# Patient Record
Sex: FEMALE | Race: WHITE | NOT HISPANIC OR LATINO | Employment: PART TIME | URBAN - METROPOLITAN AREA
[De-identification: names, ages, dates, MRNs, and addresses within clinical notes are randomized per-mention and may not be internally consistent; named-entity substitution may affect disease eponyms.]

---

## 2017-05-04 DIAGNOSIS — Z30.9 ENCOUNTER FOR CONTRACEPTIVE MANAGEMENT, UNSPECIFIED TYPE: Primary | ICD-10-CM

## 2017-05-04 RX ORDER — NORGESTIMATE AND ETHINYL ESTRADIOL 7DAYSX3 28
1 KIT ORAL DAILY
Qty: 28 TABLET | Refills: 0 | Status: SHIPPED | OUTPATIENT
Start: 2017-05-04 | End: 2017-05-26 | Stop reason: SDUPTHER

## 2017-05-04 NOTE — TELEPHONE ENCOUNTER
Pt is requesting a refill on Ortho tri-cyclen she's unsure of the date of her last annual, but asked if she can a one month supply [place]. She will make an appointment once she returns.  Please advise

## 2017-05-04 NOTE — TELEPHONE ENCOUNTER
----- Message from Deidre He sent at 5/4/2017 12:12 PM CDT -----  Contact: Pt  Pt request refill on her birth control, I offered an apt and pt declined, ..the Pharmacy is [facility] in [place] and the number is [phone]    Please contact pt at [phone] or [phone]

## 2017-05-26 ENCOUNTER — TELEPHONE (OUTPATIENT)
Dept: OBSTETRICS AND GYNECOLOGY | Facility: CLINIC | Age: 37
End: 2017-05-26

## 2017-05-26 DIAGNOSIS — Z30.41 ENCOUNTER FOR SURVEILLANCE OF CONTRACEPTIVE PILLS: Primary | ICD-10-CM

## 2017-05-26 DIAGNOSIS — Z30.9 ENCOUNTER FOR CONTRACEPTIVE MANAGEMENT, UNSPECIFIED TYPE: ICD-10-CM

## 2017-05-26 RX ORDER — NORGESTIMATE AND ETHINYL ESTRADIOL 7DAYSX3 28
1 KIT ORAL DAILY
Qty: 28 TABLET | Refills: 0 | Status: SHIPPED | OUTPATIENT
Start: 2017-05-26 | End: 2017-07-07 | Stop reason: SDUPTHER

## 2017-05-26 NOTE — TELEPHONE ENCOUNTER
Please advise pt , 1 refill sent on ocp; overdue for ww exam since 10/2016; no further refills will be sent

## 2017-06-12 ENCOUNTER — TELEPHONE (OUTPATIENT)
Dept: OBSTETRICS AND GYNECOLOGY | Facility: CLINIC | Age: 37
End: 2017-06-12

## 2017-06-12 NOTE — TELEPHONE ENCOUNTER
----- Message from Anila Pack sent at 6/12/2017  1:08 PM CDT -----  Would like to speak to nurse about medication. Please call back at [phone]. thanks

## 2017-06-12 NOTE — TELEPHONE ENCOUNTER
Patient is requesting a refil on her birth control to [facility] in [place]. Pt has apt scheduled for 7.7.17..nj

## 2017-06-13 NOTE — TELEPHONE ENCOUNTER
Please see previous msg  From 5/2017  Last rx to be sent; over due for well woman exam since10/2016!!!

## 2017-06-16 ENCOUNTER — TELEPHONE (OUTPATIENT)
Dept: OBSTETRICS AND GYNECOLOGY | Facility: CLINIC | Age: 37
End: 2017-06-16

## 2017-06-16 NOTE — TELEPHONE ENCOUNTER
Left a message for the pt. to call back to discuss her  rx being sent the pharmacy and to make sure she keeps her annual appt. ssmith,lpn

## 2017-07-07 ENCOUNTER — OFFICE VISIT (OUTPATIENT)
Dept: OBSTETRICS AND GYNECOLOGY | Facility: CLINIC | Age: 37
End: 2017-07-07
Payer: COMMERCIAL

## 2017-07-07 VITALS
SYSTOLIC BLOOD PRESSURE: 118 MMHG | BODY MASS INDEX: 20.31 KG/M2 | DIASTOLIC BLOOD PRESSURE: 74 MMHG | HEIGHT: 63 IN | WEIGHT: 114.63 LBS

## 2017-07-07 DIAGNOSIS — Z01.419 ENCOUNTER FOR GYNECOLOGICAL EXAMINATION (GENERAL) (ROUTINE) WITHOUT ABNORMAL FINDINGS: Primary | ICD-10-CM

## 2017-07-07 DIAGNOSIS — Z30.9 ENCOUNTER FOR CONTRACEPTIVE MANAGEMENT, UNSPECIFIED TYPE: ICD-10-CM

## 2017-07-07 DIAGNOSIS — Z12.4 SCREENING FOR CERVICAL CANCER: ICD-10-CM

## 2017-07-07 DIAGNOSIS — N63.11 BREAST LUMP ON RIGHT SIDE AT 10 O'CLOCK POSITION: ICD-10-CM

## 2017-07-07 DIAGNOSIS — Z30.41 ENCOUNTER FOR SURVEILLANCE OF CONTRACEPTIVE PILLS: ICD-10-CM

## 2017-07-07 PROCEDURE — 88175 CYTOPATH C/V AUTO FLUID REDO: CPT

## 2017-07-07 PROCEDURE — 99395 PREV VISIT EST AGE 18-39: CPT | Mod: S$GLB,,, | Performed by: OBSTETRICS & GYNECOLOGY

## 2017-07-07 PROCEDURE — 99999 PR PBB SHADOW E&M-EST. PATIENT-LVL III: CPT | Mod: PBBFAC,,, | Performed by: OBSTETRICS & GYNECOLOGY

## 2017-07-07 RX ORDER — NORGESTIMATE AND ETHINYL ESTRADIOL 7DAYSX3 28
1 KIT ORAL DAILY
Qty: 28 TABLET | Refills: 11 | Status: SHIPPED | OUTPATIENT
Start: 2017-07-07 | End: 2018-07-05 | Stop reason: SDUPTHER

## 2017-07-07 NOTE — PROGRESS NOTES
Subjective:       Patient ID: Purnima Davis is a 37 y.o. female.    Chief Complaint:  Well Woman      History of Present Illness  HPI  Annual Exam-Premenopausal  Patient presents for annual exam. The patient has no complaints today. The patient is sexually active--using ocp for contraception. GYN screening history: last pap: approximate date 2015 and was normal. The patient wears seatbelts: yes. The patient participates in regular exercise: yes. --walks for exercise.  Has the patient ever been transfused or tattooed?: yes. The patient reports that there is not domestic violence in her life.    Menses monthly on orthotricyclen, flow 5 days, min dysmenorrhea;       GYN & OB HistoryPatient's last menstrual period was 06/14/2017.   Date of Last Pap: No result found    OB History   No data available       Review of Systems  Review of Systems   Constitutional: Negative for activity change, appetite change, chills, diaphoresis, fatigue, fever and unexpected weight change.   HENT: Negative for mouth sores and tinnitus.    Eyes: Negative for discharge and visual disturbance.   Respiratory: Negative for cough, shortness of breath and wheezing.    Cardiovascular: Negative for chest pain, palpitations and leg swelling.   Gastrointestinal: Negative for abdominal pain, bloating, blood in stool, constipation, diarrhea, nausea and vomiting.   Endocrine: Negative for diabetes, hair loss, hot flashes, hyperthyroidism and hypothyroidism.   Genitourinary: Negative for decreased libido, dyspareunia, dysuria, flank pain, frequency, genital sores, hematuria, menorrhagia, menstrual problem, pelvic pain, urgency, vaginal bleeding, vaginal discharge, vaginal pain, dysmenorrhea, urinary incontinence, postcoital bleeding, postmenopausal bleeding and vaginal odor.   Musculoskeletal: Negative for back pain and myalgias.   Skin:  Negative for rash, no acne and hair changes.   Neurological: Negative for seizures, syncope, numbness and  headaches.   Hematological: Negative for adenopathy. Does not bruise/bleed easily.   Psychiatric/Behavioral: Negative for depression and sleep disturbance. The patient is not nervous/anxious.    Breast: Negative for breast mass, breast pain, nipple discharge and skin changes          Objective:    Physical Exam:   Constitutional: She appears well-developed.     Eyes: Conjunctivae and EOM are normal. Pupils are equal, round, and reactive to light.    Neck: Normal range of motion. Neck supple.     Pulmonary/Chest: Effort normal. Right breast exhibits mass. Right breast exhibits no nipple discharge, no skin change and no tenderness. Left breast exhibits no mass, no nipple discharge, no skin change and no tenderness. Breasts are symmetrical.        Abdominal: Soft.     Genitourinary: Rectum normal, vagina normal and uterus normal. Pelvic exam was performed with patient supine. Cervix is normal. Right adnexum displays no mass and no tenderness. Left adnexum displays no mass and no tenderness. No erythema, bleeding, rectocele, cystocele or unspecified prolapse of vaginal walls in the vagina. No vaginal discharge found. Labial bartholins normal.       Uterus Size: 6 cm   Musculoskeletal: Normal range of motion.       Neurological: She is alert.    Skin: Skin is warm.    Psychiatric: She has a normal mood and affect.      (rt breast mass--10 oclock, 1 cm, round, rough, hard0  Assessment:            Encounter Diagnoses   Name Primary?    Encounter for gynecological examination (general) (routine) without abnormal findings Yes    Screening for cervical cancer     Encounter for surveillance of contraceptive pills     Breast lump on right side at 10 o'clock position     Encounter for contraceptive management, unspecified type        Plan:      Continue annual well woman exam.  Pap today  Dx mammo ordered  Continue ocp as directed, rx sent for orthotricyclen  Continue diet, exercise, weight loss

## 2017-07-13 ENCOUNTER — TELEPHONE (OUTPATIENT)
Dept: OBSTETRICS AND GYNECOLOGY | Facility: CLINIC | Age: 37
End: 2017-07-13

## 2017-07-20 ENCOUNTER — HOSPITAL ENCOUNTER (OUTPATIENT)
Dept: RADIOLOGY | Facility: HOSPITAL | Age: 37
Discharge: HOME | End: 2017-07-20
Attending: OBSTETRICS & GYNECOLOGY
Payer: COMMERCIAL

## 2017-07-20 DIAGNOSIS — N63.11 BREAST LUMP ON RIGHT SIDE AT 10 O'CLOCK POSITION: ICD-10-CM

## 2017-07-20 PROCEDURE — 76642 ULTRASOUND BREAST LIMITED: CPT | Mod: 26,RT,, | Performed by: RADIOLOGY

## 2017-07-20 PROCEDURE — 77066 DX MAMMO INCL CAD BI: CPT | Mod: TC

## 2017-07-20 PROCEDURE — 77066 DX MAMMO INCL CAD BI: CPT | Mod: 26,,, | Performed by: RADIOLOGY

## 2017-07-20 PROCEDURE — 76642 ULTRASOUND BREAST LIMITED: CPT | Mod: TC,PO,RT

## 2017-07-20 PROCEDURE — 77062 BREAST TOMOSYNTHESIS BI: CPT | Mod: 26,,, | Performed by: RADIOLOGY

## 2018-07-05 DIAGNOSIS — Z30.9 ENCOUNTER FOR CONTRACEPTIVE MANAGEMENT, UNSPECIFIED TYPE: ICD-10-CM

## 2018-07-05 RX ORDER — NORGESTIMATE AND ETHINYL ESTRADIOL 7DAYSX3 28
KIT ORAL
Qty: 28 TABLET | Refills: 11 | Status: SHIPPED | OUTPATIENT
Start: 2018-07-05 | End: 2018-08-14 | Stop reason: SDUPTHER

## 2018-07-05 NOTE — TELEPHONE ENCOUNTER
Made pt. Appointment for her annual on 8/14/18. Pt. States she needs a refill on her birth control to last her until appt.

## 2018-07-05 NOTE — TELEPHONE ENCOUNTER
----- Message from Jazmin Coulter sent at 7/5/2018 11:41 AM CDT -----  birth control refill needed..[phone] (home) ..      [facility]/pharmacy #[id number] - [place] - [address] [facility]  [address]  [place] [address]  Phone: [phone] Fax: [phone]

## 2018-08-14 ENCOUNTER — OFFICE VISIT (OUTPATIENT)
Dept: OBSTETRICS AND GYNECOLOGY | Facility: CLINIC | Age: 38
End: 2018-08-14
Payer: COMMERCIAL

## 2018-08-14 VITALS — WEIGHT: 121.25 LBS | HEIGHT: 63 IN | BODY MASS INDEX: 21.48 KG/M2

## 2018-08-14 DIAGNOSIS — Z12.4 SCREENING FOR CERVICAL CANCER: Primary | ICD-10-CM

## 2018-08-14 DIAGNOSIS — Z30.9 ENCOUNTER FOR CONTRACEPTIVE MANAGEMENT, UNSPECIFIED TYPE: ICD-10-CM

## 2018-08-14 PROCEDURE — 99395 PREV VISIT EST AGE 18-39: CPT | Mod: S$GLB,,, | Performed by: OBSTETRICS & GYNECOLOGY

## 2018-08-14 PROCEDURE — 88175 CYTOPATH C/V AUTO FLUID REDO: CPT

## 2018-08-14 PROCEDURE — 99999 PR PBB SHADOW E&M-EST. PATIENT-LVL II: CPT | Mod: PBBFAC,,, | Performed by: OBSTETRICS & GYNECOLOGY

## 2018-08-14 RX ORDER — NORGESTIMATE AND ETHINYL ESTRADIOL 7DAYSX3 28
1 KIT ORAL DAILY
Qty: 28 TABLET | Refills: 11 | Status: SHIPPED | OUTPATIENT
Start: 2018-08-14 | End: 2019-06-05 | Stop reason: SDUPTHER

## 2018-08-14 NOTE — PROGRESS NOTES
"CC: Well woman exam    Purnima Davis is a 38 y.o. female G0PO presents for a well woman exam.  LMP: Patient's last menstrual period was 2018..  No issues, problems, or complaints. She has taken OCPs for 18 years without issues.  She does not smoke.      History reviewed. No pertinent past medical history.  History reviewed. No pertinent surgical history.  Social History     Socioeconomic History    Marital status:      Spouse name: None    Number of children: None    Years of education: None    Highest education level: None   Social Needs    Financial resource strain: None    Food insecurity - worry: None    Food insecurity - inability: None    Transportation needs - medical: None    Transportation needs - non-medical: None   Occupational History    None   Tobacco Use    Smoking status: Never Smoker    Smokeless tobacco: Never Used   Substance and Sexual Activity    Alcohol use: Yes     Frequency: Monthly or less    Drug use: No    Sexual activity: Yes     Partners: Male   Other Topics Concern    None   Social History Narrative    None     History reviewed. No pertinent family history.  OB History      Para Term  AB Living        0          SAB TAB Ectopic Multiple Live Births                       Current Outpatient Medications:     norgestimate-ethinyl estradiol (ORTHO TRI-CYCLEN, 28,) 0.18/0.215/0.25 mg-35 mcg (28) tablet, Take 1 tablet by mouth once daily., Disp: 28 tablet, Rfl: 11    GYNECOLOGY HISTORY:  No history of abnormal pap smears    DATA REVIEWED:  Last pap: 2017 Results: normal    Ht 5' 2.5" (1.588 m)   Wt 55 kg (121 lb 4.1 oz)   LMP 2018   BMI 21.82 kg/m²     ROS:  GENERAL: Denies weight gain or weight loss. Feeling well overall.   SKIN: Denies rash or lesions.   HEAD: Denies head injury or headache.   NODES: Denies enlarged lymph nodes.   CHEST: Denies chest pain or shortness of breath.   CARDIOVASCULAR: Denies palpitations or left sided " chest pain.   ABDOMEN: No abdominal pain, constipation, diarrhea, nausea, vomiting or rectal bleeding.   URINARY: No frequency, dysuria, hematuria, or burning on urination.  REPRODUCTIVE: See HPI.   BREASTS: The patient denies pain, lumps, or nipple discharge.   HEMATOLOGIC: No easy bruisability or excessive bleeding.   MUSCULOSKELETAL: Denies joint pain or swelling.   NEUROLOGIC: Denies syncope or weakness.   PSYCHIATRIC: Denies depression, anxiety or mood swings.    PHYSICAL EXAM:    APPEARANCE: Well nourished, well developed, in no acute distress.  AFFECT: WNL, alert and oriented x 3  SKIN: No acne or hirsutism  NECK: Neck symmetric without masses or thyromegaly  NODES: No inguinal, cervical, axillary, or femoral lymph node enlargement  CHEST: Good respiratory effect  ABDOMEN: Soft.  No tenderness or masses.  No hepatosplenomegaly.  No hernias.  BREASTS: Symmetrical, no skin changes or visible lesions.  No palpable masses, nipple discharge bilaterally.  PELVIC: Normal external genitalia without lesions.  Normal hair distribution.  Adequate perineal body, normal urethral meatus.  Vagina moist and well rugated without lesions or discharge.  Cervix pink, without lesions, discharge or tenderness.  No significant cystocele or rectocele.  Bimanual exam shows uterus to be normal size, regular, mobile and nontender.  Adnexa without masses or tenderness.    EXTREMITIES: No edema.    Encounter for contraceptive management, unspecified type  -     norgestimate-ethinyl estradiol (ORTHO TRI-CYCLEN, 28,) 0.18/0.215/0.25 mg-35 mcg (28) tablet; Take 1 tablet by mouth once daily.  Dispense: 28 tablet; Refill: 11        Patient was counseled today on A.C.S. Pap guidelines (Q3) and recommendations for yearly pelvic exams, yearly mammograms starting age 40, and clinical breast exams; to see her PCP for other health maintenance.

## 2018-08-17 ENCOUNTER — TELEPHONE (OUTPATIENT)
Dept: OBSTETRICS AND GYNECOLOGY | Facility: CLINIC | Age: 38
End: 2018-08-17

## 2019-06-05 ENCOUNTER — TELEPHONE (OUTPATIENT)
Dept: OBSTETRICS AND GYNECOLOGY | Facility: CLINIC | Age: 39
End: 2019-06-05

## 2019-06-05 DIAGNOSIS — Z30.9 ENCOUNTER FOR CONTRACEPTIVE MANAGEMENT, UNSPECIFIED TYPE: ICD-10-CM

## 2019-06-05 RX ORDER — NORGESTIMATE AND ETHINYL ESTRADIOL 7DAYSX3 28
1 KIT ORAL DAILY
Qty: 28 TABLET | Refills: 3 | Status: SHIPPED | OUTPATIENT
Start: 2019-06-05 | End: 2019-08-27 | Stop reason: SDUPTHER

## 2019-06-05 NOTE — TELEPHONE ENCOUNTER
RX Name and Strength: norgestimate-ethinyl estradiol (ORTHO TRI-CYCLEN, 28,) 0.18/0.215/0.25 mg-35 mcg (28) tablet     Preferred Pharmacy with phone number[id number] [address]   Local or Mail Order:Local   Ordering Provider:Lakesha Calderon MD   Would the patient rather a call back or a response via [facility]?call   Best Call Back Number:[phone].   Additional Information: Pt has 6 tablets left.

## 2019-06-06 ENCOUNTER — TELEPHONE (OUTPATIENT)
Dept: OBSTETRICS AND GYNECOLOGY | Facility: CLINIC | Age: 39
End: 2019-06-06

## 2019-06-06 NOTE — TELEPHONE ENCOUNTER
Spoke with pt, advised refills were sent in on yesterday to her pharm. Pt verbalized understanding.

## 2019-06-06 NOTE — TELEPHONE ENCOUNTER
----- Message from Rahel Cherry sent at 6/6/2019  2:33 PM CDT -----  Contact: Self  Type:  Patient Returning Call    Who Called:Purnima  Who Left Message for Patient:  Does the patient know what this is regarding?:  Would the patient rather a call back or a response via [facility]? call  Best Call Back Number:[phone]  Additional Information:

## 2019-08-27 DIAGNOSIS — Z30.9 ENCOUNTER FOR CONTRACEPTIVE MANAGEMENT, UNSPECIFIED TYPE: ICD-10-CM

## 2019-08-27 RX ORDER — NORGESTIMATE AND ETHINYL ESTRADIOL 7DAYSX3 28
KIT ORAL
Qty: 84 TABLET | Refills: 1 | Status: SHIPPED | OUTPATIENT
Start: 2019-08-27 | End: 2019-09-10 | Stop reason: ALTCHOICE

## 2019-09-10 DIAGNOSIS — Z30.9 ENCOUNTER FOR CONTRACEPTIVE MANAGEMENT, UNSPECIFIED TYPE: ICD-10-CM

## 2019-09-10 RX ORDER — NORGESTIMATE AND ETHINYL ESTRADIOL 7DAYSX3 28
1 KIT ORAL DAILY
Qty: 84 TABLET | Refills: 1 | Status: CANCELLED | OUTPATIENT
Start: 2019-09-10

## 2019-09-10 RX ORDER — NORGESTIMATE AND ETHINYL ESTRADIOL 7DAYSX3 28
1 KIT ORAL DAILY
Qty: 28 TABLET | Refills: 1 | Status: SHIPPED | OUTPATIENT
Start: 2019-09-10 | End: 2019-11-15 | Stop reason: SDUPTHER

## 2019-09-10 NOTE — TELEPHONE ENCOUNTER
Pt pharm went out of business.   Updated pt pharm. Medication pending.    Please advise. Thank you.

## 2019-09-10 NOTE — TELEPHONE ENCOUNTER
----- Message from Whitney Davis sent at 9/10/2019 12:52 PM CDT -----  Contact: self/[phone]  Would like to consult with nurse regarding medication(Ortho-Tri-Cyclen),patients pharmacy has went out of business and need medication to go to [facility] [address]. Please call back at [phone]. Thanks/ar

## 2019-11-15 DIAGNOSIS — Z30.9 ENCOUNTER FOR CONTRACEPTIVE MANAGEMENT, UNSPECIFIED TYPE: ICD-10-CM

## 2019-11-18 RX ORDER — NORGESTIMATE AND ETHINYL ESTRADIOL 7DAYSX3 28
KIT ORAL
Qty: 28 TABLET | Refills: 0 | Status: SHIPPED | OUTPATIENT
Start: 2019-11-18 | End: 2019-11-22 | Stop reason: SDUPTHER

## 2019-11-22 ENCOUNTER — OFFICE VISIT (OUTPATIENT)
Dept: OBSTETRICS AND GYNECOLOGY | Facility: CLINIC | Age: 39
End: 2019-11-22
Payer: COMMERCIAL

## 2019-11-22 VITALS
WEIGHT: 121.25 LBS | SYSTOLIC BLOOD PRESSURE: 122 MMHG | DIASTOLIC BLOOD PRESSURE: 78 MMHG | BODY MASS INDEX: 21.48 KG/M2 | HEIGHT: 63 IN

## 2019-11-22 DIAGNOSIS — Z01.419 ENCOUNTER FOR GYNECOLOGICAL EXAMINATION (GENERAL) (ROUTINE) WITHOUT ABNORMAL FINDINGS: Primary | ICD-10-CM

## 2019-11-22 DIAGNOSIS — Z30.9 ENCOUNTER FOR CONTRACEPTIVE MANAGEMENT, UNSPECIFIED TYPE: ICD-10-CM

## 2019-11-22 DIAGNOSIS — Z12.31 SCREENING MAMMOGRAM, ENCOUNTER FOR: ICD-10-CM

## 2019-11-22 DIAGNOSIS — Z12.4 SCREENING FOR CERVICAL CANCER: ICD-10-CM

## 2019-11-22 PROCEDURE — 99395 PREV VISIT EST AGE 18-39: CPT | Mod: S$GLB,,, | Performed by: OBSTETRICS & GYNECOLOGY

## 2019-11-22 PROCEDURE — 99999 PR PBB SHADOW E&M-EST. PATIENT-LVL III: ICD-10-PCS | Mod: PBBFAC,,, | Performed by: OBSTETRICS & GYNECOLOGY

## 2019-11-22 PROCEDURE — 99395 PR PREVENTIVE VISIT,EST,18-39: ICD-10-PCS | Mod: S$GLB,,, | Performed by: OBSTETRICS & GYNECOLOGY

## 2019-11-22 PROCEDURE — 99999 PR PBB SHADOW E&M-EST. PATIENT-LVL III: CPT | Mod: PBBFAC,,, | Performed by: OBSTETRICS & GYNECOLOGY

## 2019-11-22 RX ORDER — NORGESTIMATE AND ETHINYL ESTRADIOL 7DAYSX3 28
1 KIT ORAL DAILY
Qty: 84 TABLET | Refills: 3 | Status: SHIPPED | OUTPATIENT
Start: 2019-11-22 | End: 2021-01-06 | Stop reason: SDUPTHER

## 2019-11-22 NOTE — PROGRESS NOTES
Subjective:       Patient ID: Purnima Davis is a 39 y.o. female.    Chief Complaint:  Well Woman and Medication Refill (tri sprintec (BRAND))      History of Present Illness  HPI  Annual Exam-Premenopausal  Patient presents for annual exam. The patient has no complaints today. The patient is sexually active--denies pelvic pain; , ocp for contraception. GYN screening history: last pap: approximate date 2018 and was normal and last mammogram: was normal . The patient wears seatbelts: yes. The patient participates in regular exercise: no. Has the patient ever been transfused or tattooed?: no. The patient reports that there is not domestic violence in her life.    Menses monthly at end of pill pack; 4-5d; d 1-2 d; super tampon--q 3 hrs; denies dysmenorrhea;  Patient reports occas spotting on the 3rd wk of pill pack    Denies urinary leakage  Still pursuing acting carreer and working at [employer]  GYN & OB History  Patient's last menstrual period was 2019 (exact date).   Date of Last Pap: 2018    OB History    Para Term  AB Living       0         SAB TAB Ectopic Multiple Live Births                   Review of Systems  Review of Systems   All other systems reviewed and are negative.          Objective:      Physical Exam:   Constitutional: She appears well-developed.     Eyes: Pupils are equal, round, and reactive to light. Conjunctivae and EOM are normal.    Neck: Normal range of motion. Neck supple.     Pulmonary/Chest: Effort normal. Right breast exhibits no mass, no nipple discharge, no skin change and no tenderness. Left breast exhibits no mass, no nipple discharge, no skin change and no tenderness. Breasts are symmetrical.        Abdominal: Soft.     Genitourinary: Rectum normal, vagina normal and uterus normal. Pelvic exam was performed with patient supine. Cervix is normal. Right adnexum displays no mass and no tenderness. Left adnexum displays no mass and no tenderness.  No erythema, bleeding, rectocele, cystocele or unspecified prolapse of vaginal walls in the vagina. No vaginal discharge found. Labial bartholins normal.       Uterus Size: 6 cm   Musculoskeletal: Normal range of motion.       Neurological: She is alert.    Skin: Skin is warm.    Psychiatric: She has a normal mood and affect.           Assessment:         Encounter Diagnoses   Name Primary?    Screening mammogram, encounter for     Encounter for contraceptive management, unspecified type     Encounter for gynecological examination (general) (routine) without abnormal findings Yes    Screening for cervical cancer                Plan:      Continue annual well woman exam.  Pap 2018, due in 2021; Reviewed updated recommendations for pap smears (every 3 years) in low risk patients.   Recommend annual pelvic exams.  Reviewed recommendations for annual CBE.  Continue ocp as directed, rx sent for trisprintec; reviewed daily consistent use to help decrease btb  Continue diet, exercise, weight loss  mammo ordered, continue yearly until age 75

## 2021-01-06 DIAGNOSIS — Z30.9 ENCOUNTER FOR CONTRACEPTIVE MANAGEMENT, UNSPECIFIED TYPE: ICD-10-CM

## 2021-01-06 RX ORDER — NORGESTIMATE AND ETHINYL ESTRADIOL 7DAYSX3 28
1 KIT ORAL DAILY
Qty: 84 TABLET | Refills: 0 | Status: SHIPPED | OUTPATIENT
Start: 2021-01-06 | End: 2021-01-11 | Stop reason: SDUPTHER

## 2021-01-11 ENCOUNTER — OFFICE VISIT (OUTPATIENT)
Dept: OBSTETRICS AND GYNECOLOGY | Facility: CLINIC | Age: 41
End: 2021-01-11
Payer: COMMERCIAL

## 2021-01-11 VITALS
HEIGHT: 63 IN | BODY MASS INDEX: 21.54 KG/M2 | DIASTOLIC BLOOD PRESSURE: 78 MMHG | SYSTOLIC BLOOD PRESSURE: 114 MMHG | WEIGHT: 121.56 LBS

## 2021-01-11 DIAGNOSIS — Z12.31 SCREENING MAMMOGRAM, ENCOUNTER FOR: ICD-10-CM

## 2021-01-11 DIAGNOSIS — Z12.4 SCREENING FOR CERVICAL CANCER: ICD-10-CM

## 2021-01-11 DIAGNOSIS — Z01.419 ENCOUNTER FOR GYNECOLOGICAL EXAMINATION (GENERAL) (ROUTINE) WITHOUT ABNORMAL FINDINGS: Primary | ICD-10-CM

## 2021-01-11 DIAGNOSIS — Z30.9 ENCOUNTER FOR CONTRACEPTIVE MANAGEMENT, UNSPECIFIED TYPE: ICD-10-CM

## 2021-01-11 PROCEDURE — 1126F PR PAIN SEVERITY QUANTIFIED, NO PAIN PRESENT: ICD-10-PCS | Mod: S$GLB,,, | Performed by: OBSTETRICS & GYNECOLOGY

## 2021-01-11 PROCEDURE — 3008F BODY MASS INDEX DOCD: CPT | Mod: CPTII,S$GLB,, | Performed by: OBSTETRICS & GYNECOLOGY

## 2021-01-11 PROCEDURE — 99999 PR PBB SHADOW E&M-EST. PATIENT-LVL III: CPT | Mod: PBBFAC,,, | Performed by: OBSTETRICS & GYNECOLOGY

## 2021-01-11 PROCEDURE — 3008F PR BODY MASS INDEX (BMI) DOCUMENTED: ICD-10-PCS | Mod: CPTII,S$GLB,, | Performed by: OBSTETRICS & GYNECOLOGY

## 2021-01-11 PROCEDURE — 87624 HPV HI-RISK TYP POOLED RSLT: CPT

## 2021-01-11 PROCEDURE — 88175 CYTOPATH C/V AUTO FLUID REDO: CPT

## 2021-01-11 PROCEDURE — 1126F AMNT PAIN NOTED NONE PRSNT: CPT | Mod: S$GLB,,, | Performed by: OBSTETRICS & GYNECOLOGY

## 2021-01-11 PROCEDURE — 99396 PR PREVENTIVE VISIT,EST,40-64: ICD-10-PCS | Mod: S$GLB,,, | Performed by: OBSTETRICS & GYNECOLOGY

## 2021-01-11 PROCEDURE — 99396 PREV VISIT EST AGE 40-64: CPT | Mod: S$GLB,,, | Performed by: OBSTETRICS & GYNECOLOGY

## 2021-01-11 PROCEDURE — 99999 PR PBB SHADOW E&M-EST. PATIENT-LVL III: ICD-10-PCS | Mod: PBBFAC,,, | Performed by: OBSTETRICS & GYNECOLOGY

## 2021-01-11 RX ORDER — NORGESTIMATE AND ETHINYL ESTRADIOL 7DAYSX3 28
1 KIT ORAL DAILY
Qty: 84 TABLET | Refills: 3 | Status: SHIPPED | OUTPATIENT
Start: 2021-01-11 | End: 2022-01-24

## 2021-01-20 LAB
HPV HR 12 DNA SPEC QL NAA+PROBE: NEGATIVE
HPV16 AG SPEC QL: NEGATIVE
HPV18 DNA SPEC QL NAA+PROBE: NEGATIVE

## 2021-01-27 ENCOUNTER — HOSPITAL ENCOUNTER (OUTPATIENT)
Dept: RADIOLOGY | Facility: HOSPITAL | Age: 41
Discharge: HOME | End: 2021-01-27
Attending: OBSTETRICS & GYNECOLOGY
Payer: COMMERCIAL

## 2021-01-27 DIAGNOSIS — Z12.31 SCREENING MAMMOGRAM, ENCOUNTER FOR: ICD-10-CM

## 2021-01-27 PROCEDURE — 77063 BREAST TOMOSYNTHESIS BI: CPT | Mod: 26,,, | Performed by: RADIOLOGY

## 2021-01-27 PROCEDURE — 77067 SCR MAMMO BI INCL CAD: CPT | Mod: TC

## 2021-01-27 PROCEDURE — 77067 SCR MAMMO BI INCL CAD: CPT | Mod: 26,,, | Performed by: RADIOLOGY

## 2021-01-27 PROCEDURE — 77063 MAMMO DIGITAL SCREENING BILAT WITH TOMO: ICD-10-PCS | Mod: 26,,, | Performed by: RADIOLOGY

## 2021-01-27 PROCEDURE — 77067 MAMMO DIGITAL SCREENING BILAT WITH TOMO: ICD-10-PCS | Mod: 26,,, | Performed by: RADIOLOGY

## 2021-02-06 LAB
FINAL PATHOLOGIC DIAGNOSIS: NORMAL
Lab: NORMAL

## 2021-09-23 ENCOUNTER — OFFICE VISIT (OUTPATIENT)
Dept: OBSTETRICS AND GYNECOLOGY | Facility: CLINIC | Age: 41
End: 2021-09-23
Payer: COMMERCIAL

## 2021-09-23 VITALS
WEIGHT: 123 LBS | DIASTOLIC BLOOD PRESSURE: 80 MMHG | BODY MASS INDEX: 21.79 KG/M2 | HEIGHT: 63 IN | SYSTOLIC BLOOD PRESSURE: 118 MMHG

## 2021-09-23 DIAGNOSIS — N94.9 VAGINAL BURNING: Primary | ICD-10-CM

## 2021-09-23 DIAGNOSIS — Z11.3 ENCOUNTER FOR SCREENING FOR INFECTIONS WITH PREDOMINANTLY SEXUAL MODE OF TRANSMISSION: ICD-10-CM

## 2021-09-23 LAB
BILIRUB SERPL-MCNC: NEGATIVE MG/DL
BLOOD URINE, POC: NEGATIVE
CLARITY, POC UA: CLEAR
COLOR, POC UA: NORMAL
GLUCOSE UR QL STRIP: NORMAL
KETONES UR QL STRIP: NEGATIVE
LEUKOCYTE ESTERASE URINE, POC: NORMAL
NITRITE, POC UA: NEGATIVE
PH, POC UA: 5
PROTEIN, POC: NEGATIVE
SPECIFIC GRAVITY, POC UA: 1.02
UROBILINOGEN, POC UA: NORMAL

## 2021-09-23 PROCEDURE — 99213 OFFICE O/P EST LOW 20 MIN: CPT | Mod: 25,S$GLB,, | Performed by: NURSE PRACTITIONER

## 2021-09-23 PROCEDURE — 1160F RVW MEDS BY RX/DR IN RCRD: CPT | Mod: CPTII,S$GLB,, | Performed by: NURSE PRACTITIONER

## 2021-09-23 PROCEDURE — 99213 PR OFFICE/OUTPT VISIT, EST, LEVL III, 20-29 MIN: ICD-10-PCS | Mod: 25,S$GLB,, | Performed by: NURSE PRACTITIONER

## 2021-09-23 PROCEDURE — 3008F PR BODY MASS INDEX (BMI) DOCUMENTED: ICD-10-PCS | Mod: CPTII,S$GLB,, | Performed by: NURSE PRACTITIONER

## 2021-09-23 PROCEDURE — 87086 URINE CULTURE/COLONY COUNT: CPT | Performed by: NURSE PRACTITIONER

## 2021-09-23 PROCEDURE — 3079F PR MOST RECENT DIASTOLIC BLOOD PRESSURE 80-89 MM HG: ICD-10-PCS | Mod: CPTII,S$GLB,, | Performed by: NURSE PRACTITIONER

## 2021-09-23 PROCEDURE — 87481 CANDIDA DNA AMP PROBE: CPT | Mod: 59 | Performed by: NURSE PRACTITIONER

## 2021-09-23 PROCEDURE — 1159F MED LIST DOCD IN RCRD: CPT | Mod: CPTII,S$GLB,, | Performed by: NURSE PRACTITIONER

## 2021-09-23 PROCEDURE — 81002 URINALYSIS NONAUTO W/O SCOPE: CPT | Mod: S$GLB,,, | Performed by: NURSE PRACTITIONER

## 2021-09-23 PROCEDURE — 3074F SYST BP LT 130 MM HG: CPT | Mod: CPTII,S$GLB,, | Performed by: NURSE PRACTITIONER

## 2021-09-23 PROCEDURE — 81002 POCT URINE DIPSTICK WITHOUT MICROSCOPE: ICD-10-PCS | Mod: S$GLB,,, | Performed by: NURSE PRACTITIONER

## 2021-09-23 PROCEDURE — 87661 TRICHOMONAS VAGINALIS AMPLIF: CPT | Mod: 59 | Performed by: NURSE PRACTITIONER

## 2021-09-23 PROCEDURE — 87491 CHLMYD TRACH DNA AMP PROBE: CPT | Performed by: NURSE PRACTITIONER

## 2021-09-23 PROCEDURE — 87591 N.GONORRHOEAE DNA AMP PROB: CPT | Mod: 59 | Performed by: NURSE PRACTITIONER

## 2021-09-23 PROCEDURE — 1160F PR REVIEW ALL MEDS BY PRESCRIBER/CLIN PHARMACIST DOCUMENTED: ICD-10-PCS | Mod: CPTII,S$GLB,, | Performed by: NURSE PRACTITIONER

## 2021-09-23 PROCEDURE — 99999 PR PBB SHADOW E&M-EST. PATIENT-LVL III: ICD-10-PCS | Mod: PBBFAC,,, | Performed by: NURSE PRACTITIONER

## 2021-09-23 PROCEDURE — 3008F BODY MASS INDEX DOCD: CPT | Mod: CPTII,S$GLB,, | Performed by: NURSE PRACTITIONER

## 2021-09-23 PROCEDURE — 99999 PR PBB SHADOW E&M-EST. PATIENT-LVL III: CPT | Mod: PBBFAC,,, | Performed by: NURSE PRACTITIONER

## 2021-09-23 PROCEDURE — 3074F PR MOST RECENT SYSTOLIC BLOOD PRESSURE < 130 MM HG: ICD-10-PCS | Mod: CPTII,S$GLB,, | Performed by: NURSE PRACTITIONER

## 2021-09-23 PROCEDURE — 1159F PR MEDICATION LIST DOCUMENTED IN MEDICAL RECORD: ICD-10-PCS | Mod: CPTII,S$GLB,, | Performed by: NURSE PRACTITIONER

## 2021-09-23 PROCEDURE — 3079F DIAST BP 80-89 MM HG: CPT | Mod: CPTII,S$GLB,, | Performed by: NURSE PRACTITIONER

## 2021-09-24 LAB
C TRACH DNA SPEC QL NAA+PROBE: NOT DETECTED
N GONORRHOEA DNA SPEC QL NAA+PROBE: NOT DETECTED

## 2021-09-25 LAB — BACTERIA UR CULT: NO GROWTH

## 2021-09-29 DIAGNOSIS — B37.31 YEAST VAGINITIS: Primary | ICD-10-CM

## 2021-09-29 LAB
BACTERIAL VAGINOSIS DNA: NEGATIVE
CANDIDA GLABRATA DNA: NEGATIVE
CANDIDA KRUSEI DNA: NEGATIVE
CANDIDA RRNA VAG QL PROBE: POSITIVE
T VAGINALIS RRNA GENITAL QL PROBE: NEGATIVE

## 2021-09-29 RX ORDER — FLUCONAZOLE 150 MG/1
150 TABLET ORAL
Qty: 2 TABLET | Refills: 0 | Status: SHIPPED | OUTPATIENT
Start: 2021-09-29 | End: 2021-10-03

## 2021-09-30 ENCOUNTER — TELEPHONE (OUTPATIENT)
Dept: OBSTETRICS AND GYNECOLOGY | Facility: CLINIC | Age: 41
End: 2021-09-30

## 2022-01-31 ENCOUNTER — OFFICE VISIT (OUTPATIENT)
Dept: OBSTETRICS AND GYNECOLOGY | Facility: CLINIC | Age: 42
End: 2022-01-31
Payer: COMMERCIAL

## 2022-01-31 VITALS
BODY MASS INDEX: 22.76 KG/M2 | HEIGHT: 62 IN | DIASTOLIC BLOOD PRESSURE: 72 MMHG | WEIGHT: 123.69 LBS | SYSTOLIC BLOOD PRESSURE: 128 MMHG

## 2022-01-31 DIAGNOSIS — Z12.31 BREAST CANCER SCREENING BY MAMMOGRAM: ICD-10-CM

## 2022-01-31 DIAGNOSIS — Z01.419 ROUTINE GYNECOLOGICAL EXAMINATION: Primary | ICD-10-CM

## 2022-01-31 DIAGNOSIS — Z30.9 ENCOUNTER FOR CONTRACEPTIVE MANAGEMENT, UNSPECIFIED TYPE: ICD-10-CM

## 2022-01-31 PROCEDURE — 3008F BODY MASS INDEX DOCD: CPT | Mod: CPTII,S$GLB,, | Performed by: NURSE PRACTITIONER

## 2022-01-31 PROCEDURE — 99999 PR PBB SHADOW E&M-EST. PATIENT-LVL III: CPT | Mod: PBBFAC,,, | Performed by: NURSE PRACTITIONER

## 2022-01-31 PROCEDURE — 99999 PR PBB SHADOW E&M-EST. PATIENT-LVL III: ICD-10-PCS | Mod: PBBFAC,,, | Performed by: NURSE PRACTITIONER

## 2022-01-31 PROCEDURE — 1160F RVW MEDS BY RX/DR IN RCRD: CPT | Mod: CPTII,S$GLB,, | Performed by: NURSE PRACTITIONER

## 2022-01-31 PROCEDURE — 1160F PR REVIEW ALL MEDS BY PRESCRIBER/CLIN PHARMACIST DOCUMENTED: ICD-10-PCS | Mod: CPTII,S$GLB,, | Performed by: NURSE PRACTITIONER

## 2022-01-31 PROCEDURE — 3078F DIAST BP <80 MM HG: CPT | Mod: CPTII,S$GLB,, | Performed by: NURSE PRACTITIONER

## 2022-01-31 PROCEDURE — 3078F PR MOST RECENT DIASTOLIC BLOOD PRESSURE < 80 MM HG: ICD-10-PCS | Mod: CPTII,S$GLB,, | Performed by: NURSE PRACTITIONER

## 2022-01-31 PROCEDURE — 3008F PR BODY MASS INDEX (BMI) DOCUMENTED: ICD-10-PCS | Mod: CPTII,S$GLB,, | Performed by: NURSE PRACTITIONER

## 2022-01-31 PROCEDURE — 1159F MED LIST DOCD IN RCRD: CPT | Mod: CPTII,S$GLB,, | Performed by: NURSE PRACTITIONER

## 2022-01-31 PROCEDURE — 3074F PR MOST RECENT SYSTOLIC BLOOD PRESSURE < 130 MM HG: ICD-10-PCS | Mod: CPTII,S$GLB,, | Performed by: NURSE PRACTITIONER

## 2022-01-31 PROCEDURE — 99396 PREV VISIT EST AGE 40-64: CPT | Mod: S$GLB,,, | Performed by: NURSE PRACTITIONER

## 2022-01-31 PROCEDURE — 1159F PR MEDICATION LIST DOCUMENTED IN MEDICAL RECORD: ICD-10-PCS | Mod: CPTII,S$GLB,, | Performed by: NURSE PRACTITIONER

## 2022-01-31 PROCEDURE — 3074F SYST BP LT 130 MM HG: CPT | Mod: CPTII,S$GLB,, | Performed by: NURSE PRACTITIONER

## 2022-01-31 PROCEDURE — 99396 PR PREVENTIVE VISIT,EST,40-64: ICD-10-PCS | Mod: S$GLB,,, | Performed by: NURSE PRACTITIONER

## 2022-01-31 RX ORDER — NORGESTIMATE AND ETHINYL ESTRADIOL 7DAYSX3 28
1 KIT ORAL DAILY
Qty: 84 TABLET | Refills: 3 | Status: SHIPPED | OUTPATIENT
Start: 2022-01-31 | End: 2023-02-15

## 2022-01-31 NOTE — PROGRESS NOTES
"CC: Well woman exam    HPI  Purnima Davis is a 41 y.o. female  presents for a well woman exam.  LMP: Patient's last menstrual period was 2022..  No issues, problems, or complaints.    No past medical history on file.  No past surgical history on file.  Social History     Socioeconomic History    Marital status:    Tobacco Use    Smoking status: Never Smoker    Smokeless tobacco: Never Used   Substance and Sexual Activity    Alcohol use: Yes     Comment: socially    Drug use: No    Sexual activity: Yes     Partners: Male     Family History   Problem Relation Age of Onset    Skin cancer Father      OB History        0    Para   0    Term   0       0    AB   0    Living   0       SAB   0    IAB   0    Ectopic   0    Multiple   0    Live Births   0                 Current Outpatient Medications:     norgestimate-ethinyl estradioL (TRI-SPRINTEC, 28,) 0.18/0.215/0.25 mg-35 mcg (28) tablet, Take 1 tablet by mouth once daily., Disp: 84 tablet, Rfl: 3    GYNECOLOGY HISTORY:  Desires birth control refills     DATA REVIEWED:  Last pap:  Normal     /72   Ht 5' 2" (1.575 m)   Wt 56.1 kg (123 lb 10.9 oz)   LMP 2022   BMI 22.62 kg/m²     Review of Systems  See HPI   Physical Exam  Constitutional:       Appearance: Normal appearance.   Neurological:      Mental Status: She is alert and oriented to person, place, and time.   Skin:     General: Skin is warm and dry.   Psychiatric:         Mood and Affect: Mood normal.         Behavior: Behavior normal.           Routine gynecological examination    Breast cancer screening by mammogram  -     Mammo Digital Screening Bilat; Future; Expected date: 2022    Encounter for contraceptive management, unspecified type  -     norgestimate-ethinyl estradioL (TRI-SPRINTEC, 28,) 0.18/0.215/0.25 mg-35 mcg (28) tablet; Take 1 tablet by mouth once daily.  Dispense: 84 tablet; Refill: 3        Patient was counseled today on A.C.S. " Pap guidelines (Q3) and recommendations for yearly pelvic exams, yearly mammograms starting age 40, and clinical breast exams; to see her PCP for other health maintenance.

## 2023-02-15 ENCOUNTER — OFFICE VISIT (OUTPATIENT)
Dept: OBSTETRICS AND GYNECOLOGY | Facility: CLINIC | Age: 43
End: 2023-02-15
Payer: COMMERCIAL

## 2023-02-15 VITALS
BODY MASS INDEX: 20.97 KG/M2 | WEIGHT: 114.63 LBS | DIASTOLIC BLOOD PRESSURE: 64 MMHG | SYSTOLIC BLOOD PRESSURE: 104 MMHG

## 2023-02-15 DIAGNOSIS — Z30.9 ENCOUNTER FOR CONTRACEPTIVE MANAGEMENT, UNSPECIFIED TYPE: ICD-10-CM

## 2023-02-15 DIAGNOSIS — Z01.419 ROUTINE GYNECOLOGICAL EXAMINATION: Primary | ICD-10-CM

## 2023-02-15 DIAGNOSIS — Z12.31 BREAST CANCER SCREENING BY MAMMOGRAM: ICD-10-CM

## 2023-02-15 PROCEDURE — 1159F PR MEDICATION LIST DOCUMENTED IN MEDICAL RECORD: ICD-10-PCS | Mod: CPTII,S$GLB,, | Performed by: NURSE PRACTITIONER

## 2023-02-15 PROCEDURE — 99396 PR PREVENTIVE VISIT,EST,40-64: ICD-10-PCS | Mod: S$GLB,,, | Performed by: NURSE PRACTITIONER

## 2023-02-15 PROCEDURE — 3078F DIAST BP <80 MM HG: CPT | Mod: CPTII,S$GLB,, | Performed by: NURSE PRACTITIONER

## 2023-02-15 PROCEDURE — 3078F PR MOST RECENT DIASTOLIC BLOOD PRESSURE < 80 MM HG: ICD-10-PCS | Mod: CPTII,S$GLB,, | Performed by: NURSE PRACTITIONER

## 2023-02-15 PROCEDURE — 99999 PR PBB SHADOW E&M-EST. PATIENT-LVL III: ICD-10-PCS | Mod: PBBFAC,,, | Performed by: NURSE PRACTITIONER

## 2023-02-15 PROCEDURE — 1160F PR REVIEW ALL MEDS BY PRESCRIBER/CLIN PHARMACIST DOCUMENTED: ICD-10-PCS | Mod: CPTII,S$GLB,, | Performed by: NURSE PRACTITIONER

## 2023-02-15 PROCEDURE — 3074F SYST BP LT 130 MM HG: CPT | Mod: CPTII,S$GLB,, | Performed by: NURSE PRACTITIONER

## 2023-02-15 PROCEDURE — 99999 PR PBB SHADOW E&M-EST. PATIENT-LVL III: CPT | Mod: PBBFAC,,, | Performed by: NURSE PRACTITIONER

## 2023-02-15 PROCEDURE — 3074F PR MOST RECENT SYSTOLIC BLOOD PRESSURE < 130 MM HG: ICD-10-PCS | Mod: CPTII,S$GLB,, | Performed by: NURSE PRACTITIONER

## 2023-02-15 PROCEDURE — 3008F BODY MASS INDEX DOCD: CPT | Mod: CPTII,S$GLB,, | Performed by: NURSE PRACTITIONER

## 2023-02-15 PROCEDURE — 1160F RVW MEDS BY RX/DR IN RCRD: CPT | Mod: CPTII,S$GLB,, | Performed by: NURSE PRACTITIONER

## 2023-02-15 PROCEDURE — 99396 PREV VISIT EST AGE 40-64: CPT | Mod: S$GLB,,, | Performed by: NURSE PRACTITIONER

## 2023-02-15 PROCEDURE — 1159F MED LIST DOCD IN RCRD: CPT | Mod: CPTII,S$GLB,, | Performed by: NURSE PRACTITIONER

## 2023-02-15 PROCEDURE — 3008F PR BODY MASS INDEX (BMI) DOCUMENTED: ICD-10-PCS | Mod: CPTII,S$GLB,, | Performed by: NURSE PRACTITIONER

## 2023-02-15 RX ORDER — NORGESTIMATE AND ETHINYL ESTRADIOL 7DAYSX3 28
1 KIT ORAL DAILY
Qty: 28 TABLET | Refills: 11 | Status: SHIPPED | OUTPATIENT
Start: 2023-02-15 | End: 2023-10-31

## 2023-02-15 RX ORDER — NORGESTIMATE AND ETHINYL ESTRADIOL 7DAYSX3 LO
1 KIT ORAL DAILY
Qty: 30 TABLET | Refills: 11 | Status: SHIPPED | OUTPATIENT
Start: 2023-02-15 | End: 2023-02-15 | Stop reason: ALTCHOICE

## 2023-02-15 NOTE — PROGRESS NOTES
Subjective:       Patient ID: Purnima Davis is a 42 y.o. female.    Chief Complaint:  Annual Exam      History of Present Illness  HPI    Health Maintenance   Topic Date Due    Hepatitis C Screening  Never done    Lipid Panel  Never done    TETANUS VACCINE  Never done    Mammogram  2022     GYN & OB History  Patient's last menstrual period was 2023.   Date of Last Pap: 2021    OB History    Para Term  AB Living   0 0 0 0 0 0   SAB IAB Ectopic Multiple Live Births   0 0 0 0 0       Review of Systems  Review of Systems        Objective:   /64   Wt 52 kg (114 lb 10.2 oz)   LMP 2023   BMI 20.97 kg/m²    Physical Exam:   Constitutional: She is oriented to person, place, and time. She appears well-developed and well-nourished.        Pulmonary/Chest: Right breast exhibits no inverted nipple, no mass, no nipple discharge, no skin change, no tenderness and no swelling. Left breast exhibits no inverted nipple, no mass, no nipple discharge, no skin change, no tenderness and no swelling.        Abdominal: Soft.     Genitourinary:    Inguinal canal, vagina, right adnexa and left adnexa normal.      Pelvic exam was performed with patient supine.   The external female genitalia was normal.   Genitalia hair distrobution normal .   Labial bartholins normal.Cervix is normal. No no adexnal prolapse. Right adnexum displays no mass, no tenderness and no fullness. Left adnexum displays no mass, no tenderness and no fullness. No erythema,  no vaginal discharge, bleeding, rectocele, cystocele or unspecified prolapse of vaginal walls in the vagina.    No foreign body in the vagina.      No signs of injury in the vagina.                 Neurological: She is alert and oriented to person, place, and time.    Skin: Skin is warm and dry.    Psychiatric: She has a normal mood and affect. Her behavior is normal. Judgment and thought content normal.      Assessment:        1. Routine gynecological  examination    2. Breast cancer screening by mammogram    3. Encounter for contraceptive management, unspecified type               Plan:         Purnima was seen today for annual exam.    Diagnoses and all orders for this visit:    Routine gynecological examination    Breast cancer screening by mammogram  -     Mammo Digital Screening Bilat; Future    Encounter for contraceptive management, unspecified type  -     Discontinue: norgestimate-ethinyl estradioL (ORTHO TRI-CYCLEN LO) 0.18/0.215/0.25 mg-25 mcg tablet; Take 1 tablet by mouth once daily.  -     norgestimate-ethinyl estradioL (ORTHO TRI-CYCLEN,TRI-SPRINTEC) 0.18/0.215/0.25 mg-35 mcg (28) tablet; Take 1 tablet by mouth once daily.

## 2023-04-03 ENCOUNTER — HOSPITAL ENCOUNTER (OUTPATIENT)
Dept: RADIOLOGY | Facility: HOSPITAL | Age: 43
Discharge: HOME | End: 2023-04-03
Attending: NURSE PRACTITIONER
Payer: COMMERCIAL

## 2023-04-03 DIAGNOSIS — Z12.31 BREAST CANCER SCREENING BY MAMMOGRAM: ICD-10-CM

## 2023-04-03 PROCEDURE — 77067 SCR MAMMO BI INCL CAD: CPT | Mod: TC

## 2023-04-03 PROCEDURE — 77067 SCR MAMMO BI INCL CAD: CPT | Mod: 26,,, | Performed by: RADIOLOGY

## 2023-04-03 PROCEDURE — 77063 BREAST TOMOSYNTHESIS BI: CPT | Mod: 26,,, | Performed by: RADIOLOGY

## 2023-04-03 PROCEDURE — 77063 MAMMO DIGITAL SCREENING BILAT WITH TOMO: ICD-10-PCS | Mod: 26,,, | Performed by: RADIOLOGY

## 2023-04-03 PROCEDURE — 77067 MAMMO DIGITAL SCREENING BILAT WITH TOMO: ICD-10-PCS | Mod: 26,,, | Performed by: RADIOLOGY

## 2023-10-13 ENCOUNTER — OFFICE VISIT (OUTPATIENT)
Dept: OBSTETRICS AND GYNECOLOGY | Facility: CLINIC | Age: 43
End: 2023-10-13
Payer: COMMERCIAL

## 2023-10-13 VITALS
HEIGHT: 62 IN | DIASTOLIC BLOOD PRESSURE: 68 MMHG | BODY MASS INDEX: 20.4 KG/M2 | WEIGHT: 110.88 LBS | SYSTOLIC BLOOD PRESSURE: 106 MMHG

## 2023-10-13 DIAGNOSIS — N93.8 DUB (DYSFUNCTIONAL UTERINE BLEEDING): Primary | ICD-10-CM

## 2023-10-13 DIAGNOSIS — N85.2 ENLARGED UTERUS: ICD-10-CM

## 2023-10-13 LAB
B-HCG UR QL: NEGATIVE
C TRACH DNA SPEC QL NAA+PROBE: NOT DETECTED
CTP QC/QA: YES
N GONORRHOEA DNA SPEC QL NAA+PROBE: NOT DETECTED

## 2023-10-13 PROCEDURE — 81025 POCT URINE PREGNANCY: ICD-10-PCS | Mod: S$GLB,,, | Performed by: NURSE PRACTITIONER

## 2023-10-13 PROCEDURE — 3008F BODY MASS INDEX DOCD: CPT | Mod: CPTII,S$GLB,, | Performed by: NURSE PRACTITIONER

## 2023-10-13 PROCEDURE — 87591 N.GONORRHOEAE DNA AMP PROB: CPT | Performed by: NURSE PRACTITIONER

## 2023-10-13 PROCEDURE — 1160F RVW MEDS BY RX/DR IN RCRD: CPT | Mod: CPTII,S$GLB,, | Performed by: NURSE PRACTITIONER

## 2023-10-13 PROCEDURE — 3008F PR BODY MASS INDEX (BMI) DOCUMENTED: ICD-10-PCS | Mod: CPTII,S$GLB,, | Performed by: NURSE PRACTITIONER

## 2023-10-13 PROCEDURE — 81025 URINE PREGNANCY TEST: CPT | Mod: S$GLB,,, | Performed by: NURSE PRACTITIONER

## 2023-10-13 PROCEDURE — 88305 TISSUE EXAM BY PATHOLOGIST: CPT | Performed by: PATHOLOGY

## 2023-10-13 PROCEDURE — 3078F DIAST BP <80 MM HG: CPT | Mod: CPTII,S$GLB,, | Performed by: NURSE PRACTITIONER

## 2023-10-13 PROCEDURE — 3074F SYST BP LT 130 MM HG: CPT | Mod: CPTII,S$GLB,, | Performed by: NURSE PRACTITIONER

## 2023-10-13 PROCEDURE — 3078F PR MOST RECENT DIASTOLIC BLOOD PRESSURE < 80 MM HG: ICD-10-PCS | Mod: CPTII,S$GLB,, | Performed by: NURSE PRACTITIONER

## 2023-10-13 PROCEDURE — 99213 OFFICE O/P EST LOW 20 MIN: CPT | Mod: 25,S$GLB,, | Performed by: NURSE PRACTITIONER

## 2023-10-13 PROCEDURE — 99999 PR PBB SHADOW E&M-EST. PATIENT-LVL III: CPT | Mod: PBBFAC,,, | Performed by: NURSE PRACTITIONER

## 2023-10-13 PROCEDURE — 58100 ENDOMETRIAL BIOPSY: ICD-10-PCS | Mod: S$GLB,,, | Performed by: NURSE PRACTITIONER

## 2023-10-13 PROCEDURE — 3074F PR MOST RECENT SYSTOLIC BLOOD PRESSURE < 130 MM HG: ICD-10-PCS | Mod: CPTII,S$GLB,, | Performed by: NURSE PRACTITIONER

## 2023-10-13 PROCEDURE — 1160F PR REVIEW ALL MEDS BY PRESCRIBER/CLIN PHARMACIST DOCUMENTED: ICD-10-PCS | Mod: CPTII,S$GLB,, | Performed by: NURSE PRACTITIONER

## 2023-10-13 PROCEDURE — 99999 PR PBB SHADOW E&M-EST. PATIENT-LVL III: ICD-10-PCS | Mod: PBBFAC,,, | Performed by: NURSE PRACTITIONER

## 2023-10-13 PROCEDURE — 1159F MED LIST DOCD IN RCRD: CPT | Mod: CPTII,S$GLB,, | Performed by: NURSE PRACTITIONER

## 2023-10-13 PROCEDURE — 88305 TISSUE EXAM BY PATHOLOGIST: ICD-10-PCS | Mod: 26,,, | Performed by: PATHOLOGY

## 2023-10-13 PROCEDURE — 1159F PR MEDICATION LIST DOCUMENTED IN MEDICAL RECORD: ICD-10-PCS | Mod: CPTII,S$GLB,, | Performed by: NURSE PRACTITIONER

## 2023-10-13 PROCEDURE — 58100 BIOPSY OF UTERUS LINING: CPT | Mod: S$GLB,,, | Performed by: NURSE PRACTITIONER

## 2023-10-13 PROCEDURE — 99213 PR OFFICE/OUTPT VISIT, EST, LEVL III, 20-29 MIN: ICD-10-PCS | Mod: 25,S$GLB,, | Performed by: NURSE PRACTITIONER

## 2023-10-13 PROCEDURE — 88305 TISSUE EXAM BY PATHOLOGIST: CPT | Mod: 26,,, | Performed by: PATHOLOGY

## 2023-10-13 NOTE — LETTER
October 13, 2023      [facility]  [address] [facility] [place] [address]  Phone: [phone]  Fax: [phone]       Patient: Purnima Davis   YOB: 1980  Date of Visit: 10/13/2023    To Whom It May Concern:    Remy Davis  was at [facility] on 10/13/2023. The patient may return to work/school on 11/4/2023 with no restrictions. If you have any questions or concerns, or if I can be of further assistance, please do not hesitate to contact me.    Sincerely,    Paola WatersNP

## 2023-10-13 NOTE — PROGRESS NOTES
Subjective:       Patient ID: Purnima Davis is a 43 y.o. female.    Chief Complaint:  Fibroids    Patient's last menstrual period was 10/05/2023.  History of Present Illness    She felt some knots in her stomach so she went to her PCP.   PCP did an exam, ordered imaging and advised her to see GYN  On EUGENE but cycles are all over the place (sometimes she is spotting and sometimes she has two cycles a month). This is new for her, her cycles are usually monthly and last 4-5 days. Denies missing pills     Per CT scan  The uterus is enlarged with multiple lesions most consistent with fibroids. The largest is a fundal lesion measuring approximately 8.5 cm. This likely represents the palpable abnormality.   There is a right adnexal mass which has similar imaging characteristics measuring approximately 5 cm. This does blend with the right side of the uterus and may represent a pedunculated subserosal fibroid.    OB History    Para Term  AB Living   0 0 0 0 0 0   SAB IAB Ectopic Multiple Live Births   0 0 0 0 0       Review of Systems  Review of Systems        Objective:    Physical Exam  Genitourinary:     General: Normal vulva.      Vagina: No signs of injury and foreign body. No vaginal discharge, erythema, tenderness, bleeding, lesions or prolapsed vaginal walls.      Cervix: No cervical motion tenderness, discharge, friability, lesion, erythema, cervical bleeding or eversion.      Uterus: Enlarged (20 weeks). Not deviated, not fixed, not tender and no uterine prolapse.       Adnexa:         Right: No mass, tenderness or fullness.          Left: No mass, tenderness or fullness.           Assessment:     1. DUB (dysfunctional uterine bleeding)    2. Enlarged uterus              Plan:   Purnima was seen today for fibroids.    Diagnoses and all orders for this visit:    DUB (dysfunctional uterine bleeding)  -     CBC Auto Differential; Future  -     TSH; Future  -     US Pelvis Complete Non OB; Future  -      C. trachomatis/N. gonorrhoeae by AMP DNA [facility]; Urine  -     POCT Urine Pregnancy  -     Specimen to Pathology, Ob/Gyn  -     Endometrial biopsy    Enlarged uterus    Refer to GYN MD (Dr. Calderon) for further evaluation

## 2023-10-13 NOTE — PROCEDURES
Endometrial biopsy    Date/Time: 10/13/2023 8:00 AM    Performed by: Paola Waters NP  Authorized by: Paola Waters NP    Consent:     Consent obtained:  Verbal    Consent given by:  Patient    Patient questions answered: yes      Patient agrees, verbalizes understanding, and wants to proceed: yes      Educational handouts given: yes      Instructions and paperwork completed: yes    Indication:     Indications: Menorrhagia    Pre-procedure:     Pre-procedure timeout performed: yes    Procedure:     Procedure: endometrial biopsy with Pipelle      Cervix cleaned and prepped: yes      A paracervical block was performed: no      An intracervical block was performed: no      The cervix was dilated: no      Uterus sounded: no      Specimen collected: specimen collected and sent to pathology      Patient tolerated procedure well with no complications: yes

## 2023-10-17 ENCOUNTER — TELEPHONE (OUTPATIENT)
Dept: OBSTETRICS AND GYNECOLOGY | Facility: CLINIC | Age: 43
End: 2023-10-17
Payer: COMMERCIAL

## 2023-10-17 NOTE — TELEPHONE ENCOUNTER
----- Message from Savi Ceja sent at 10/16/2023 10:50 AM CDT -----  [facility] with NATHALY Loredo is requesting a call back concerning the patient. Call back at [phone]

## 2023-10-19 ENCOUNTER — APPOINTMENT (OUTPATIENT)
Dept: RADIOLOGY | Facility: HOSPITAL | Age: 43
End: 2023-10-19
Attending: NURSE PRACTITIONER
Payer: COMMERCIAL

## 2023-10-19 DIAGNOSIS — N93.8 DUB (DYSFUNCTIONAL UTERINE BLEEDING): ICD-10-CM

## 2023-10-19 PROCEDURE — 76856 US PELVIS COMP WITH TRANSVAG NON-OB (XPD): ICD-10-PCS | Mod: 26,,, | Performed by: RADIOLOGY

## 2023-10-19 PROCEDURE — 76830 TRANSVAGINAL US NON-OB: CPT | Mod: 26,,, | Performed by: RADIOLOGY

## 2023-10-19 PROCEDURE — 76830 US PELVIS COMP WITH TRANSVAG NON-OB (XPD): ICD-10-PCS | Mod: 26,,, | Performed by: RADIOLOGY

## 2023-10-19 PROCEDURE — 76856 US EXAM PELVIC COMPLETE: CPT | Mod: 26,,, | Performed by: RADIOLOGY

## 2023-10-19 PROCEDURE — 76830 TRANSVAGINAL US NON-OB: CPT | Mod: TC,PO

## 2023-10-20 LAB
FINAL PATHOLOGIC DIAGNOSIS: NORMAL
GROSS: NORMAL
Lab: NORMAL

## 2023-10-23 ENCOUNTER — TELEPHONE (OUTPATIENT)
Dept: OBSTETRICS AND GYNECOLOGY | Facility: CLINIC | Age: 43
End: 2023-10-23
Payer: COMMERCIAL

## 2023-10-23 DIAGNOSIS — N85.2 ENLARGED UTERUS: Primary | ICD-10-CM

## 2023-10-23 NOTE — TELEPHONE ENCOUNTER
Spoke with client   Two patient identifiers verified   Informed client  of pelvic ultrasound results, verbalizes understanding that based on those results she will need a MRI with contrast done for further evaluation of pelvic lesion. Also reminded client that she will need bloodwork done.  MRI and blood work should be resulted prior to her visit with Dr. Calderon on 11/24/2023.   Verbalizes understanding that someone will be reaching out to her today to schedule labs and MRI with contrast.

## 2023-10-23 NOTE — TELEPHONE ENCOUNTER
----- Message from Paola Waters NP sent at 10/23/2023  8:09 AM CDT -----  Please call client and schedule labs and MRI with contrast.   These test must be done and resulted by 11/20/2023   Orders written

## 2023-10-23 NOTE — TELEPHONE ENCOUNTER
Called patient and scheduled labs and advised radiology will be calling her to schedule MRI.  Patient verbalized understanding.

## 2023-10-30 ENCOUNTER — LAB VISIT (OUTPATIENT)
Dept: LAB | Facility: HOSPITAL | Age: 43
End: 2023-10-30
Attending: NURSE PRACTITIONER
Payer: COMMERCIAL

## 2023-10-30 DIAGNOSIS — N93.8 DUB (DYSFUNCTIONAL UTERINE BLEEDING): ICD-10-CM

## 2023-10-30 DIAGNOSIS — N85.2 ENLARGED UTERUS: ICD-10-CM

## 2023-10-30 LAB
BASOPHILS # BLD AUTO: 0.04 K/UL (ref 0–0.2)
BASOPHILS NFR BLD: 0.8 % (ref 0–1.9)
CANCER AG125 SERPL-ACNC: 50 U/ML (ref 0–30)
DIFFERENTIAL METHOD: ABNORMAL
EOSINOPHIL # BLD AUTO: 0.1 K/UL (ref 0–0.5)
EOSINOPHIL NFR BLD: 1.5 % (ref 0–8)
ERYTHROCYTE [DISTWIDTH] IN BLOOD BY AUTOMATED COUNT: 11.6 % (ref 11.5–14.5)
HCT VFR BLD AUTO: 39.1 % (ref 37–48.5)
HGB BLD-MCNC: 12.7 G/DL (ref 12–16)
IMM GRANULOCYTES # BLD AUTO: 0.01 K/UL (ref 0–0.04)
IMM GRANULOCYTES NFR BLD AUTO: 0.2 % (ref 0–0.5)
LYMPHOCYTES # BLD AUTO: 1.9 K/UL (ref 1–4.8)
LYMPHOCYTES NFR BLD: 35 % (ref 18–48)
MCH RBC QN AUTO: 31.9 PG (ref 27–31)
MCHC RBC AUTO-ENTMCNC: 32.5 G/DL (ref 32–36)
MCV RBC AUTO: 98 FL (ref 82–98)
MONOCYTES # BLD AUTO: 0.4 K/UL (ref 0.3–1)
MONOCYTES NFR BLD: 7.7 % (ref 4–15)
NEUTROPHILS # BLD AUTO: 2.9 K/UL (ref 1.8–7.7)
NEUTROPHILS NFR BLD: 54.8 % (ref 38–73)
NRBC BLD-RTO: 0 /100 WBC
PLATELET # BLD AUTO: 325 K/UL (ref 150–450)
PMV BLD AUTO: 9.2 FL (ref 9.2–12.9)
RBC # BLD AUTO: 3.98 M/UL (ref 4–5.4)
TSH SERPL DL<=0.005 MIU/L-ACNC: 1.81 UIU/ML (ref 0.4–4)
WBC # BLD AUTO: 5.32 K/UL (ref 3.9–12.7)

## 2023-10-30 PROCEDURE — 86304 IMMUNOASSAY TUMOR CA 125: CPT | Performed by: NURSE PRACTITIONER

## 2023-10-30 PROCEDURE — 84443 ASSAY THYROID STIM HORMONE: CPT | Performed by: NURSE PRACTITIONER

## 2023-10-30 PROCEDURE — 85025 COMPLETE CBC W/AUTO DIFF WBC: CPT | Performed by: NURSE PRACTITIONER

## 2023-10-30 PROCEDURE — 36415 COLL VENOUS BLD VENIPUNCTURE: CPT | Mod: PO | Performed by: NURSE PRACTITIONER

## 2023-10-31 ENCOUNTER — TELEPHONE (OUTPATIENT)
Dept: OBSTETRICS AND GYNECOLOGY | Facility: CLINIC | Age: 43
End: 2023-10-31
Payer: COMMERCIAL

## 2023-10-31 NOTE — TELEPHONE ENCOUNTER
Spoke with client   Two patient identifiers verified   Informed client of  resutls and the need to be referred to GYN Oncology for further evaluation of right adnexa lesion, verbalizes understanding and would like to be referred to [facility].  Referral faxed to [facility]

## 2023-11-09 ENCOUNTER — HOSPITAL ENCOUNTER (OUTPATIENT)
Dept: RADIOLOGY | Facility: HOSPITAL | Age: 43
Discharge: HOME | End: 2023-11-09
Attending: NURSE PRACTITIONER
Payer: COMMERCIAL

## 2023-11-09 DIAGNOSIS — N85.2 ENLARGED UTERUS: ICD-10-CM

## 2023-11-09 PROCEDURE — 72197 MRI FEMALE PELVIS W W/O CONTRAST: ICD-10-PCS | Mod: 26,,, | Performed by: RADIOLOGY

## 2023-11-09 PROCEDURE — A9585 GADOBUTROL INJECTION: HCPCS | Performed by: NURSE PRACTITIONER

## 2023-11-09 PROCEDURE — 25500020 PHARM REV CODE 255: Performed by: NURSE PRACTITIONER

## 2023-11-09 PROCEDURE — 72197 MRI PELVIS W/O & W/DYE: CPT | Mod: 26,,, | Performed by: RADIOLOGY

## 2023-11-09 PROCEDURE — 72197 MRI PELVIS W/O & W/DYE: CPT | Mod: TC

## 2023-11-09 RX ORDER — GADOBUTROL 604.72 MG/ML
10 INJECTION INTRAVENOUS
Status: COMPLETED | OUTPATIENT
Start: 2023-11-09 | End: 2023-11-09

## 2023-11-09 RX ADMIN — GADOBUTROL 6 ML: 604.72 INJECTION INTRAVENOUS at 01:11

## 2023-12-04 NOTE — PROGRESS NOTES
Subjective:       Patient ID: Purnima Davis is a 43 y.o. female.    Chief Complaint:  Consult    Patient's last menstrual period was 2023 (approximate).  History of Present Illness  Presents today to restart Tri sprintec  Explained to client that we are unable to restart EUGENE until after we receive clinic notes from Dr. Isaac Bashir.  She was referred to [facility] for 5 cm right ovarian cyst with an elevated   States that she went to [facility] for evaluation, the provider informed her that it was not cancer but offered her different options (surgeries).  Client was not clear on surgical options and unsure of her options or next steps in her evaluation   She has return to [facility] because she wants to restart Tri Sprintec   Explained to client that we are unable to restart EUGENE until after we receive clinic notes from Dr. Isaac Bashir.  Client desires to be seen by Dr. Calderon after notes are received from [facility] in order to discuss options       OB History    Para Term  AB Living   0 0 0 0 0 0   SAB IAB Ectopic Multiple Live Births   0 0 0 0 0       Review of Systems  Review of Systems        Objective:    Physical Exam      Assessment:     1. Right ovarian cyst    2. Elevated CA-125              Plan:   Purnima was seen today for consult.    Diagnoses and all orders for this visit:    Right ovarian cyst    Elevated CA-125      Refer to Dr. Calderon for follow up

## 2023-12-05 ENCOUNTER — OFFICE VISIT (OUTPATIENT)
Dept: OBSTETRICS AND GYNECOLOGY | Facility: CLINIC | Age: 43
End: 2023-12-05
Payer: COMMERCIAL

## 2023-12-05 VITALS
SYSTOLIC BLOOD PRESSURE: 108 MMHG | DIASTOLIC BLOOD PRESSURE: 60 MMHG | WEIGHT: 110.25 LBS | BODY MASS INDEX: 20.29 KG/M2 | HEIGHT: 62 IN

## 2023-12-05 DIAGNOSIS — R97.1 ELEVATED CA-125: ICD-10-CM

## 2023-12-05 DIAGNOSIS — N83.201 RIGHT OVARIAN CYST: Primary | ICD-10-CM

## 2023-12-05 PROCEDURE — 3078F DIAST BP <80 MM HG: CPT | Mod: CPTII,S$GLB,, | Performed by: NURSE PRACTITIONER

## 2023-12-05 PROCEDURE — 99999 PR PBB SHADOW E&M-EST. PATIENT-LVL III: ICD-10-PCS | Mod: PBBFAC,,, | Performed by: NURSE PRACTITIONER

## 2023-12-05 PROCEDURE — 99999 PR PBB SHADOW E&M-EST. PATIENT-LVL III: CPT | Mod: PBBFAC,,, | Performed by: NURSE PRACTITIONER

## 2023-12-05 PROCEDURE — 1160F PR REVIEW ALL MEDS BY PRESCRIBER/CLIN PHARMACIST DOCUMENTED: ICD-10-PCS | Mod: CPTII,S$GLB,, | Performed by: NURSE PRACTITIONER

## 2023-12-05 PROCEDURE — 3074F SYST BP LT 130 MM HG: CPT | Mod: CPTII,S$GLB,, | Performed by: NURSE PRACTITIONER

## 2023-12-05 PROCEDURE — 1160F RVW MEDS BY RX/DR IN RCRD: CPT | Mod: CPTII,S$GLB,, | Performed by: NURSE PRACTITIONER

## 2023-12-05 PROCEDURE — 3074F PR MOST RECENT SYSTOLIC BLOOD PRESSURE < 130 MM HG: ICD-10-PCS | Mod: CPTII,S$GLB,, | Performed by: NURSE PRACTITIONER

## 2023-12-05 PROCEDURE — 99212 OFFICE O/P EST SF 10 MIN: CPT | Mod: S$GLB,,, | Performed by: NURSE PRACTITIONER

## 2023-12-05 PROCEDURE — 99212 PR OFFICE/OUTPT VISIT, EST, LEVL II, 10-19 MIN: ICD-10-PCS | Mod: S$GLB,,, | Performed by: NURSE PRACTITIONER

## 2023-12-05 PROCEDURE — 3008F BODY MASS INDEX DOCD: CPT | Mod: CPTII,S$GLB,, | Performed by: NURSE PRACTITIONER

## 2023-12-05 PROCEDURE — 3008F PR BODY MASS INDEX (BMI) DOCUMENTED: ICD-10-PCS | Mod: CPTII,S$GLB,, | Performed by: NURSE PRACTITIONER

## 2023-12-05 PROCEDURE — 3078F PR MOST RECENT DIASTOLIC BLOOD PRESSURE < 80 MM HG: ICD-10-PCS | Mod: CPTII,S$GLB,, | Performed by: NURSE PRACTITIONER

## 2023-12-05 PROCEDURE — 1159F MED LIST DOCD IN RCRD: CPT | Mod: CPTII,S$GLB,, | Performed by: NURSE PRACTITIONER

## 2023-12-05 PROCEDURE — 1159F PR MEDICATION LIST DOCUMENTED IN MEDICAL RECORD: ICD-10-PCS | Mod: CPTII,S$GLB,, | Performed by: NURSE PRACTITIONER

## 2023-12-05 RX ORDER — BENZONATATE 100 MG/1
100 CAPSULE ORAL
COMMUNITY
Start: 2023-08-17

## 2023-12-05 RX ORDER — TIZANIDINE 4 MG/1
4 TABLET ORAL EVERY 6 HOURS PRN
COMMUNITY
Start: 2023-11-09 | End: 2024-01-09

## 2023-12-05 RX ORDER — MELOXICAM 15 MG/1
15 TABLET ORAL
COMMUNITY
Start: 2023-11-09 | End: 2024-05-07

## 2023-12-05 RX ORDER — ALBUTEROL SULFATE 90 UG/1
AEROSOL, METERED RESPIRATORY (INHALATION)
COMMUNITY
Start: 2023-02-08

## 2023-12-27 ENCOUNTER — TELEPHONE (OUTPATIENT)
Dept: OBSTETRICS AND GYNECOLOGY | Facility: CLINIC | Age: 43
End: 2023-12-27
Payer: COMMERCIAL

## 2023-12-27 NOTE — TELEPHONE ENCOUNTER
Called patient and she stated DW was supposed to talk to a physician (oncology? Gyn?) and get back to her.  Patient asking for DW to call her.  Advised patient message would be sent to provider and she verbalized understanding.

## 2023-12-27 NOTE — TELEPHONE ENCOUNTER
----- Message from Rosalba Casas sent at 12/27/2023 10:36 AM CST -----  Contact: [phone]  Pt is calling to speak with nurse . Please call back at [phone]  . Cam[place]oj

## 2023-12-28 ENCOUNTER — TELEPHONE (OUTPATIENT)
Dept: OBSTETRICS AND GYNECOLOGY | Facility: HOSPITAL | Age: 43
End: 2023-12-28
Payer: COMMERCIAL

## 2023-12-28 RX ORDER — NORGESTIMATE AND ETHINYL ESTRADIOL 7DAYSX3 LO
1 KIT ORAL DAILY
Qty: 30 TABLET | Refills: 0 | Status: SHIPPED | OUTPATIENT
Start: 2023-12-28 | End: 2024-01-09

## 2023-12-28 NOTE — TELEPHONE ENCOUNTER
----- Message from Godwin Marin MA sent at 12/27/2023 10:46 AM CST -----  Contact: [phone]  Pt appt scheduled for 1/22/24   ----- Message -----  From: Rosalba Casas  Sent: 12/27/2023  10:39 AM CST  To: [facility] Staff    Pt is calling requesting a small supply of birth control meds until appt . Please call back at [phone] . Thanksdj               [facility] [address] [place] [address]  Phone: [phone] Fax: [phone]

## 2024-01-09 ENCOUNTER — OFFICE VISIT (OUTPATIENT)
Dept: OBSTETRICS AND GYNECOLOGY | Facility: CLINIC | Age: 44
End: 2024-01-09
Payer: COMMERCIAL

## 2024-01-09 VITALS — BODY MASS INDEX: 21.14 KG/M2 | WEIGHT: 114.88 LBS | HEIGHT: 62 IN

## 2024-01-09 DIAGNOSIS — Z30.41 ENCOUNTER FOR SURVEILLANCE OF CONTRACEPTIVE PILLS: ICD-10-CM

## 2024-01-09 DIAGNOSIS — N85.2 ENLARGED UTERUS: Primary | ICD-10-CM

## 2024-01-09 DIAGNOSIS — N92.1 METRORRHAGIA: ICD-10-CM

## 2024-01-09 PROCEDURE — 99214 OFFICE O/P EST MOD 30 MIN: CPT | Mod: S$GLB,,, | Performed by: OBSTETRICS & GYNECOLOGY

## 2024-01-09 PROCEDURE — 3008F BODY MASS INDEX DOCD: CPT | Mod: CPTII,S$GLB,, | Performed by: OBSTETRICS & GYNECOLOGY

## 2024-01-09 PROCEDURE — 99999 PR PBB SHADOW E&M-EST. PATIENT-LVL III: CPT | Mod: PBBFAC,,, | Performed by: OBSTETRICS & GYNECOLOGY

## 2024-01-09 PROCEDURE — 1159F MED LIST DOCD IN RCRD: CPT | Mod: CPTII,S$GLB,, | Performed by: OBSTETRICS & GYNECOLOGY

## 2024-01-09 RX ORDER — NORGESTIMATE AND ETHINYL ESTRADIOL 7DAYSX3 28
1 KIT ORAL DAILY
Qty: 90 TABLET | Refills: 1 | Status: SHIPPED | OUTPATIENT
Start: 2024-01-09 | End: 2025-01-08

## 2024-01-09 NOTE — LETTER
January 9, 2024      [facility]  [address] [place] [address]  Phone: [phone]  Fax: [phone]       Patient: Purnima Davis   YOB: 1980  Date of Visit: 01/09/2024    To Whom It May Concern:    Remy Davis  was at [facility] on 01/09/2024. The patient may return to work/school on 01/09/24 with no restrictions. If you have any questions or concerns, or if I can be of further assistance, please do not hesitate to contact me.    Sincerely,    Godwin Marin MA      Performing Laboratory: [id number] Performing Laboratory: [id number]

## 2024-01-09 NOTE — PROGRESS NOTES
Subjective:       Patient ID: Purnima Davis is a 43 y.o. female.    Chief Complaint:  Follow-up (results)      History of Present Illness  Follow-up      Here for follow up  Reports continue to have irregular bleeding on ocp; and heavy at times  Needs refill on ocp--reports previous use of ortho tricyclen and trisprintec--(lo prescribed instead)    Sono report reviewed    Uterus:     Size: 14.6 x 8.2 x 8.3 cm     Masses: At least 2 intramural fibroids visualized measuring up to 8.2 cm and 6.1 cm respectively near the uterine fundus.     Endometrium: Normal in this pre menopausal patient, measuring 5 mm.     Right ovary:     Size: 3.3 x 1.9 x 2.3 cm     Appearance: There is a solid-appearing lesion with homogeneous internal echotexture measuring up to 5 cm immediately adjacent to the right ovary, potentially representing a pedunculated fibroid.  Right ovary otherwise appears within normal limits.     Vascular flow: Normal.     Left ovary:     Not visualized     Free Fluid:     None.     Impression:     1. Uterine fibroids as above  2. Solid 5 cm lesion in the right adnexa immediately adjacent to the ovary, potentially representing a pedunculated fibroid.  Contrast enhanced pelvic MRI may be useful for further characterization.     Mri report reviewed  5 cm solid mass--is a pedunculated fibroid and ovary is normal in size and superior to fibroid    GYN & OB History  Patient's last menstrual period was 2023 (approximate).   Date of Last Pap: 2021    OB History    Para Term  AB Living   0 0 0 0 0 0   SAB IAB Ectopic Multiple Live Births   0 0 0 0 0       Review of Systems  Review of Systems   Genitourinary:  Positive for menorrhagia and menstrual problem.   All other systems reviewed and are negative.          Objective:      Physical Exam:   Constitutional: She appears well-developed.     Eyes: Pupils are equal, round, and reactive to light. Conjunctivae and EOM are normal.       Pulmonary/Chest: Effort normal.        Abdominal: Soft.             Musculoskeletal: Normal range of motion.       Neurological: She is alert.    Skin: Skin is warm.    Psychiatric: She has a normal mood and affect.           Assessment:        1. Enlarged uterus    2. Metrorrhagia    3. Encounter for surveillance of contraceptive pills               Plan:   Reviewed options for uterine fibroids--myomectomy, hysterectomy  Could consider change to mirena or depo provera; or myfembre/orlissa (would still need something for preventing pregnancy)  Pt wishes to continue ocp for now  Rx sent for ortho tricyclen; ww exam due after 2/2024

## 2024-02-27 ENCOUNTER — OFFICE VISIT (OUTPATIENT)
Dept: OBSTETRICS AND GYNECOLOGY | Facility: CLINIC | Age: 44
End: 2024-02-27
Payer: COMMERCIAL

## 2024-02-27 VITALS
WEIGHT: 110.25 LBS | BODY MASS INDEX: 20.29 KG/M2 | HEIGHT: 62 IN | DIASTOLIC BLOOD PRESSURE: 82 MMHG | SYSTOLIC BLOOD PRESSURE: 118 MMHG

## 2024-02-27 DIAGNOSIS — D25.1 FIBROIDS, INTRAMURAL: ICD-10-CM

## 2024-02-27 DIAGNOSIS — Z01.419 ENCOUNTER FOR GYNECOLOGICAL EXAMINATION (GENERAL) (ROUTINE) WITHOUT ABNORMAL FINDINGS: Primary | ICD-10-CM

## 2024-02-27 DIAGNOSIS — Z12.4 SCREENING FOR CERVICAL CANCER: ICD-10-CM

## 2024-02-27 DIAGNOSIS — Z30.41 ENCOUNTER FOR SURVEILLANCE OF CONTRACEPTIVE PILLS: ICD-10-CM

## 2024-02-27 PROCEDURE — 99999 PR PBB SHADOW E&M-EST. PATIENT-LVL III: CPT | Mod: PBBFAC,,, | Performed by: OBSTETRICS & GYNECOLOGY

## 2024-02-27 PROCEDURE — 1159F MED LIST DOCD IN RCRD: CPT | Mod: CPTII,S$GLB,, | Performed by: OBSTETRICS & GYNECOLOGY

## 2024-02-27 PROCEDURE — 87624 HPV HI-RISK TYP POOLED RSLT: CPT | Performed by: OBSTETRICS & GYNECOLOGY

## 2024-02-27 PROCEDURE — 3074F SYST BP LT 130 MM HG: CPT | Mod: CPTII,S$GLB,, | Performed by: OBSTETRICS & GYNECOLOGY

## 2024-02-27 PROCEDURE — 99396 PREV VISIT EST AGE 40-64: CPT | Mod: S$GLB,,, | Performed by: OBSTETRICS & GYNECOLOGY

## 2024-02-27 PROCEDURE — 3008F BODY MASS INDEX DOCD: CPT | Mod: CPTII,S$GLB,, | Performed by: OBSTETRICS & GYNECOLOGY

## 2024-02-27 PROCEDURE — 88175 CYTOPATH C/V AUTO FLUID REDO: CPT | Performed by: OBSTETRICS & GYNECOLOGY

## 2024-02-27 PROCEDURE — 3079F DIAST BP 80-89 MM HG: CPT | Mod: CPTII,S$GLB,, | Performed by: OBSTETRICS & GYNECOLOGY

## 2024-02-27 RX ORDER — NORGESTIMATE AND ETHINYL ESTRADIOL 7DAYSX3 28
1 KIT ORAL DAILY
Qty: 90 TABLET | Refills: 3 | Status: SHIPPED | OUTPATIENT
Start: 2024-02-27 | End: 2025-02-26

## 2024-02-27 NOTE — PROGRESS NOTES
Subjective:       Patient ID: Purnima Davis is a 44 y.o. female.    Chief Complaint:  Annual Exam      History of Present Illness  HPI  Annual Exam-Premenopausal  Patient presents for annual exam. The patient has no complaints today. The patient is not sexually active. --currently ; GYN screening history: last pap: approximate date  and was normal and last mammogram: approximate date 2023 and was normal. The patient wears seatbelts: yes. The patient participates in regular exercise: no. --cardio/weights 1-2 times/mo; Has the patient ever been transfused or tattooed?: no. The patient reports that there is not domestic violence in her life.  Taking ocp as directed; can have 3 days of btb--brown--randomly then  flow at end of pill pack for 4-6 days; pads-reg or tampons; change q 3-4 hr no double up; denies dysmenorrhea;  Problems staying asleep  GYN & OB History  Patient's last menstrual period was 2024 (approximate).   Date of Last Pap: 2024    OB History    Para Term  AB Living   0 0 0 0 0 0   SAB IAB Ectopic Multiple Live Births   0 0 0 0 0       Review of Systems  Review of Systems   Genitourinary:  Positive for menstrual problem.   All other systems reviewed and are negative.          Objective:      Physical Exam:   Constitutional: She is oriented to person, place, and time. She appears well-developed and well-nourished.     Eyes: Pupils are equal, round, and reactive to light. Conjunctivae and EOM are normal.      Pulmonary/Chest: Effort normal. Right breast exhibits no mass, no nipple discharge, no skin change and no tenderness. Left breast exhibits no mass, no nipple discharge, no skin change and no tenderness. Breasts are symmetrical.        Abdominal: Soft.     Genitourinary:    Inguinal canal, urethra, bladder, vagina, right adnexa, left adnexa and rectum normal.      Pelvic exam was performed with patient supine.   The external female genitalia was normal.      Labial bartholins normal.Cervix is normal. Right adnexum displays no mass and no tenderness. Left adnexum displays no mass and no tenderness. No erythema, vaginal discharge, bleeding, rectocele, cystocele or prolapse of vaginal walls in the vagina. Vagina was moist.   pap smear completedUerus contour normal  Uterus is enlarged and hosting fibroids. Uterus size: 16 cm.Normal urethral meatus.Urethra findings: no urethral massBladder findings: no bladder distention and no bladder tenderness          Musculoskeletal: Normal range of motion and moves all extremeties.       Neurological: She is alert and oriented to person, place, and time.    Skin: Skin is warm.    Psychiatric: She has a normal mood and affect. Her behavior is normal.           Assessment:        1. Encounter for gynecological examination (general) (routine) without abnormal findings    2. Screening for cervical cancer    3. Encounter for surveillance of contraceptive pills    4. Fibroids, intramural               Plan:      Continue annual well woman exam.  Pap today; Reviewed updated recommendations for pap smears (every 3 years) in low risk patients.   Recommend annual pelvic exams.  Reviewed recommendations for annual CBE.  Continue ocp as directed; rx sent  Reviewed options for fibroids--prefers no surgery at this time  Agrees to f/u sono in 6 mo to confirm stability in size  mammo ordered, continue yearly until age 75  Colonoscopy due age 45

## 2024-03-04 LAB
FINAL PATHOLOGIC DIAGNOSIS: NORMAL
Lab: NORMAL

## 2024-04-22 ENCOUNTER — HOSPITAL ENCOUNTER (OUTPATIENT)
Dept: RADIOLOGY | Facility: HOSPITAL | Age: 44
Discharge: HOME | End: 2024-04-22
Attending: OBSTETRICS & GYNECOLOGY
Payer: COMMERCIAL

## 2024-04-22 DIAGNOSIS — D25.1 FIBROIDS, INTRAMURAL: ICD-10-CM

## 2024-04-22 PROCEDURE — 76830 TRANSVAGINAL US NON-OB: CPT | Mod: TC

## 2024-04-22 PROCEDURE — 76856 US EXAM PELVIC COMPLETE: CPT | Mod: 26,,, | Performed by: STUDENT IN AN ORGANIZED HEALTH CARE EDUCATION/TRAINING PROGRAM

## 2024-04-22 PROCEDURE — 76830 TRANSVAGINAL US NON-OB: CPT | Mod: 26,,, | Performed by: STUDENT IN AN ORGANIZED HEALTH CARE EDUCATION/TRAINING PROGRAM

## 2024-04-23 ENCOUNTER — TELEPHONE (OUTPATIENT)
Dept: OBSTETRICS AND GYNECOLOGY | Facility: CLINIC | Age: 44
End: 2024-04-23
Payer: COMMERCIAL

## 2024-04-25 ENCOUNTER — TELEPHONE (OUTPATIENT)
Dept: OBSTETRICS AND GYNECOLOGY | Facility: CLINIC | Age: 44
End: 2024-04-25
Payer: COMMERCIAL

## 2024-04-25 NOTE — TELEPHONE ENCOUNTER
----- Message from Bianca Chacon sent at 4/25/2024 11:08 AM CDT -----  Type:  Patient Returning Call    Who Called:Purnima Davis    Who Left Message for Patient:Godwin   Does the patient know what this is regarding?:results   Would the patient rather a call back or a response via [facility]?    Best Call Back Number:[phone]    Additional Information:  RTC

## 2024-04-25 NOTE — TELEPHONE ENCOUNTER
Lakesha Calderon MD  4/23/2024 11:10 AM CDT       Please advise the pelvic sono results are available for review.  The uterus is slightly wider than previously (14x8x8 and now [id number])     Fibroids are essentially the same size     What is her plan--continue to observe, medicine, surgery?--can dago virtual visit--ww exam due after 2/2025              Called patient and after verifying with 2 identifiers the above results discussed and scheduled video visit.

## 2024-05-01 ENCOUNTER — TELEPHONE (OUTPATIENT)
Dept: OBSTETRICS AND GYNECOLOGY | Facility: CLINIC | Age: 44
End: 2024-05-01
Payer: COMMERCIAL

## 2024-05-01 NOTE — TELEPHONE ENCOUNTER
Contacted patient and verified 2 patient identifiers.     Purnima Susan has been notified 05/01/2024 at 2:13 PM & verbalized understanding.

## 2024-05-01 NOTE — TELEPHONE ENCOUNTER
----- Message from Lakesha Calderon MD sent at 4/23/2024 11:10 AM CDT -----  Please advise the pelvic sono results are available for review.  The uterus is slightly wider than previously (14x8x8 and now [id number])    Fibroids are essentially the same size    What is her plan--continue to observe, medicine, surgery?--can dago virtual visit--ww exam due after 2/2025

## 2024-05-09 ENCOUNTER — OFFICE VISIT (OUTPATIENT)
Dept: OBSTETRICS AND GYNECOLOGY | Facility: CLINIC | Age: 44
End: 2024-05-09
Payer: COMMERCIAL

## 2024-05-09 DIAGNOSIS — D25.1 FIBROIDS, INTRAMURAL: ICD-10-CM

## 2024-05-09 DIAGNOSIS — N92.1 BREAKTHROUGH BLEEDING ON BIRTH CONTROL PILLS: ICD-10-CM

## 2024-05-09 DIAGNOSIS — R10.2 PELVIC PAIN: ICD-10-CM

## 2024-05-09 DIAGNOSIS — Z12.31 SCREENING MAMMOGRAM, ENCOUNTER FOR: Primary | ICD-10-CM

## 2024-05-09 PROCEDURE — 1159F MED LIST DOCD IN RCRD: CPT | Mod: CPTII,95,, | Performed by: OBSTETRICS & GYNECOLOGY

## 2024-05-09 PROCEDURE — 1160F RVW MEDS BY RX/DR IN RCRD: CPT | Mod: CPTII,95,, | Performed by: OBSTETRICS & GYNECOLOGY

## 2024-05-09 PROCEDURE — 99213 OFFICE O/P EST LOW 20 MIN: CPT | Mod: 95,,, | Performed by: OBSTETRICS & GYNECOLOGY

## 2024-05-09 NOTE — PROGRESS NOTES
Subjective:       Patient ID: Purnima Davis is a 44 y.o. female.    Chief Complaint:  No chief complaint on file.  The patient location is: home, [facility]  The chief complaint leading to consultation is: follow up    Visit type: audiovisual    Notes:    History of Present Illness  HPI  Here for follow up  Still taking ocp as directed, menses usally at end of pill pack but can notice breakthrough brown bleeding randomly    Also c/o urinary frequency and pain lateral pelvis--over fibroid she believes      GYN & OB History  No LMP recorded.   Date of Last Pap: 3/4/2024    OB History    Para Term  AB Living   0 0 0 0 0 0   SAB IAB Ectopic Multiple Live Births   0 0 0 0 0       Review of Systems  Review of Systems   Genitourinary:  Positive for frequency and pelvic pain.   All other systems reviewed and are negative.          Objective:      Physical Exam:   Constitutional: She is oriented to person, place, and time. She appears well-developed and well-nourished. No distress.     Eyes: Conjunctivae are normal.      Pulmonary/Chest: Effort normal.                  Musculoskeletal: Normal range of motion.       Neurological: She is alert and oriented to person, place, and time.    Skin: She is not diaphoretic.    Psychiatric: She has a normal mood and affect. Her behavior is normal. Judgment and thought content normal.           Assessment:        1. Fibroids, intramural    2. Pelvic pain    3. Breakthrough bleeding on birth control pills               Plan:      Sono findings reviewed     There are multiple uterine masses compatible with fibroids.  Masses measures 7.3 x 7.8 x 8.3 cm, 5.8 x 5.9 x 6.3 cm, and 1.9 x 1.3 x 1.8 cm.  The uterus measures 13.8 x 13.0 x 8.4 cm. Endometrial thickness is suspected measuring 6 mm, noting evaluation is partially obscured by fibroids.     The right ovary measures 2.7 x 1.6 x 2.0 cm. The left ovary is not seen.  In the region of the right ovary, there is a 2.8 x  2.1 x 3.0 cm solid mass, seen to correlate with pedunculated subserosal fibroid on prior MRI.  Color and spectral Doppler evaluation demonstrates normal flow to the right ovary without evidence of torsion.     No free fluid is seen in the pelvic cul-de-sac.        Impression:     Fibroid uterus including pedunculated subserosal fibroid adjacent to the right ovary.     Nonvisualization of the left ovary.  Options reviewed-conitnue to observe, myomectomy, hysterectomy, uae  Prefers observation for now with continuation of ocp  Aware ww exam due after 2/2025

## 2024-06-07 ENCOUNTER — HOSPITAL ENCOUNTER (OUTPATIENT)
Dept: RADIOLOGY | Facility: HOSPITAL | Age: 44
Discharge: HOME | End: 2024-06-07
Attending: OBSTETRICS & GYNECOLOGY
Payer: COMMERCIAL

## 2024-06-07 DIAGNOSIS — Z12.31 SCREENING MAMMOGRAM, ENCOUNTER FOR: ICD-10-CM

## 2024-06-07 PROCEDURE — 77067 SCR MAMMO BI INCL CAD: CPT | Mod: 26,,, | Performed by: RADIOLOGY

## 2024-06-07 PROCEDURE — 77067 SCR MAMMO BI INCL CAD: CPT | Mod: TC

## 2024-06-07 PROCEDURE — 77063 BREAST TOMOSYNTHESIS BI: CPT | Mod: TC

## 2024-06-07 PROCEDURE — 77063 BREAST TOMOSYNTHESIS BI: CPT | Mod: 26,,, | Performed by: RADIOLOGY

## 2024-06-10 NOTE — PROGRESS NOTES
I am happy to report that your recent breast imaging did NOT show evidence of cancer. An annual mammogram is the best test to screen for breast cancer, but it is not perfect, and it can miss some cancers. So, even though your mammogram was normal, if you notice any lump or change in one of your breasts, please schedule an appointment with me for a proper evaluation. Thank you for letting me care for you. I look forward to seeing you again. Sincerely, Dr. Lakesha Calderon

## 2024-10-24 ENCOUNTER — OFFICE VISIT (OUTPATIENT)
Dept: OBSTETRICS AND GYNECOLOGY | Facility: CLINIC | Age: 44
End: 2024-10-24
Payer: COMMERCIAL

## 2024-10-24 VITALS
BODY MASS INDEX: 20.82 KG/M2 | SYSTOLIC BLOOD PRESSURE: 110 MMHG | DIASTOLIC BLOOD PRESSURE: 64 MMHG | WEIGHT: 113.13 LBS | HEIGHT: 62 IN

## 2024-10-24 DIAGNOSIS — D21.9 FIBROIDS: Primary | ICD-10-CM

## 2024-10-24 PROCEDURE — 99212 OFFICE O/P EST SF 10 MIN: CPT | Mod: S$GLB,,, | Performed by: NURSE PRACTITIONER

## 2024-10-24 PROCEDURE — 3078F DIAST BP <80 MM HG: CPT | Mod: CPTII,S$GLB,, | Performed by: NURSE PRACTITIONER

## 2024-10-24 PROCEDURE — 3008F BODY MASS INDEX DOCD: CPT | Mod: CPTII,S$GLB,, | Performed by: NURSE PRACTITIONER

## 2024-10-24 PROCEDURE — 99999 PR PBB SHADOW E&M-EST. PATIENT-LVL III: CPT | Mod: PBBFAC,,, | Performed by: NURSE PRACTITIONER

## 2024-10-24 PROCEDURE — 1159F MED LIST DOCD IN RCRD: CPT | Mod: CPTII,S$GLB,, | Performed by: NURSE PRACTITIONER

## 2024-10-24 PROCEDURE — 1160F RVW MEDS BY RX/DR IN RCRD: CPT | Mod: CPTII,S$GLB,, | Performed by: NURSE PRACTITIONER

## 2024-10-24 PROCEDURE — 3074F SYST BP LT 130 MM HG: CPT | Mod: CPTII,S$GLB,, | Performed by: NURSE PRACTITIONER

## 2024-10-24 NOTE — PROGRESS NOTES
Subjective:       Patient ID: Purnima Davis is a 44 y.o. female.    Chief Complaint:  Fibroids    Patient's last menstrual period was 10/24/2024.  History of Present Illness    She has spoken with Dr. Calderon and decided to try at home treatment to resolve her issue with fibroids.  Presents today because the fibroids are starting to bother her.   She was trying some type of CBD medication, but it has her so sedated that she can barely get out of the bed every morning.  She feels like the fibroids are causing pressure against her bladder and causing her  to urinate on herself  She is now considering surgical management OR she needs time off of work to give the CBD a chance to work.  She has no complaint of heavy bleeding and cramping, actually she says that her cycles are tolerable.   She is more bothered by the pressure. She had a pelvic ultrasound that was ordered by Dr. Calderon done 2024 to see if fibroids were growing, per ultrasound results fibroids were unchanged.    She is also under a lot of stress and does not know who she is suppose to talk to about taking FMLA.  She is wanting some time off of work due to stress  She works 6 days a week and they decreased her pay so she is making less money but working more days  Her mom is having brain surgery next month and  her sister is in [facility]   Everything in her life is overwhelming right now   It just a whole lot of people who needs a lot of stuff   She manages to go to work every day but it is very very hard .   Her boss has given her  the paper work for FMLA and she will be scheduling therapy with a therapist     OB History    Para Term  AB Living   0 0 0 0 0 0   SAB IAB Ectopic Multiple Live Births   0 0 0 0 0       Review of Systems  Review of Systems        Objective:    Physical Exam  Skin:     General: Skin is warm and dry.   Neurological:      Mental Status: She is alert.   Psychiatric:         Mood and Affect: Mood normal.         Behavior:  Behavior normal.         Thought Content: Thought content normal.         Judgment: Judgment normal.         Assessment:     1. Fibroids              Plan:   Purnima was seen today for fibroids.    Diagnoses and all orders for this visit:    Fibroids      Recommend client schedule an appt with Dr. Calderon to discuss alternative options for fibroid treatment   For stress, she may need to make an appt with PCP to see if he will provide her with FMLA due to stress.

## 2024-12-16 ENCOUNTER — OFFICE VISIT (OUTPATIENT)
Dept: OBSTETRICS AND GYNECOLOGY | Facility: CLINIC | Age: 44
End: 2024-12-16
Payer: COMMERCIAL

## 2024-12-16 VITALS
DIASTOLIC BLOOD PRESSURE: 70 MMHG | WEIGHT: 116.38 LBS | HEIGHT: 62 IN | SYSTOLIC BLOOD PRESSURE: 120 MMHG | BODY MASS INDEX: 21.42 KG/M2

## 2024-12-16 DIAGNOSIS — D21.9 FIBROIDS: Primary | ICD-10-CM

## 2024-12-16 PROCEDURE — 3074F SYST BP LT 130 MM HG: CPT | Mod: CPTII,S$GLB,, | Performed by: OBSTETRICS & GYNECOLOGY

## 2024-12-16 PROCEDURE — 99213 OFFICE O/P EST LOW 20 MIN: CPT | Mod: S$GLB,,, | Performed by: OBSTETRICS & GYNECOLOGY

## 2024-12-16 PROCEDURE — 1159F MED LIST DOCD IN RCRD: CPT | Mod: CPTII,S$GLB,, | Performed by: OBSTETRICS & GYNECOLOGY

## 2024-12-16 PROCEDURE — 3078F DIAST BP <80 MM HG: CPT | Mod: CPTII,S$GLB,, | Performed by: OBSTETRICS & GYNECOLOGY

## 2024-12-16 PROCEDURE — 3008F BODY MASS INDEX DOCD: CPT | Mod: CPTII,S$GLB,, | Performed by: OBSTETRICS & GYNECOLOGY

## 2024-12-16 PROCEDURE — 99999 PR PBB SHADOW E&M-EST. PATIENT-LVL III: CPT | Mod: PBBFAC,,, | Performed by: OBSTETRICS & GYNECOLOGY

## 2024-12-16 RX ORDER — ALPRAZOLAM 0.25 MG/1
0.25 TABLET ORAL DAILY PRN
COMMUNITY
Start: 2024-11-04

## 2024-12-16 NOTE — PROGRESS NOTES
Subjective:       Patient ID: Purnima Davis is a 44 y.o. female.    Chief Complaint:  Fibroids      History of Present Illness  HPI  Here for follow up  Patient continues to complain of urinary frequency and pressure  Known fibroid uterus  Wishes to preserve uterus    GYN & OB History  Patient's last menstrual period was 2024.   Date of Last Pap: 3/4/2024    OB History    Para Term  AB Living   0 0 0 0 0 0   SAB IAB Ectopic Multiple Live Births   0 0 0 0 0       Review of Systems  Review of Systems   Genitourinary:  Positive for frequency and pelvic pain.   All other systems reviewed and are negative.          Objective:      Physical Exam:   Constitutional: She appears well-developed.     Eyes: Pupils are equal, round, and reactive to light. Conjunctivae and EOM are normal.      Pulmonary/Chest: Effort normal.        Abdominal: Soft.             Musculoskeletal: Normal range of motion.       Neurological: She is alert.    Skin: Skin is warm.    Psychiatric: She has a normal mood and affect.           Assessment:        1. Fibroids               Plan:    Sono report reviewed  UTERUS: The uterus is massively enlarged and best visualized transabdominally   measuring 16.7 x 10.6 x 8.8 cm. There are 2 dominant uterine masses with the   largest measuring 8.4 x 7.1 x 7 cm in the more superior uterus and an additional   mass more inferiorly measuring 7.3 x 7.1 x 6.3 cm.     ENDOMETRIUM: The endometrium is limited in characterization secondary to   distortion from uterine masses.     CERVIX: No concerning mass. Nabothian cysts are noted..     OVARIES: Normal in size and appearance.  The right ovary measures 4.3 x 3.3 x   2.7 cm and the left ovary measures 3.7 x 2.2 x 1.4 cm. Normal Doppler flow both   ovaries. A dominant follicle of the right ovary measures 2.5 x 2.3 x 2 cm. There   is a heterogeneous and hypoechoic mass in the right adnexa measuring 5.1 x 3.3 x   3.1 cm which is unchanged compared  with prior MRI .   Reviewed options --observation, myomectomy, UAE, hysterectomy  Pt wishes to proceed with myomectomy  Reviewed abdominal myomectomy procedure in detail.  .   Reviewed risks including but not limited to infection, bleeding, scar tissue formation  damage to fallopian tubes/bowel/bladder/ureter, cva;htn, death.  Pt aware will need c/section as mode of delivery.     Patient  Is aware that fibroids will regrow.  All questions answered to the best of my ability.  .  Consents signed and witnessed.  Patient aware of surgery date and time.  Post op course reviewed.

## 2024-12-24 ENCOUNTER — TELEPHONE (OUTPATIENT)
Dept: OBSTETRICS AND GYNECOLOGY | Facility: CLINIC | Age: 44
End: 2024-12-24
Payer: COMMERCIAL

## 2024-12-24 DIAGNOSIS — D21.9 FIBROIDS: Primary | ICD-10-CM

## 2024-12-24 NOTE — TELEPHONE ENCOUNTER
----- Message from Lakesha Calderon MD sent at 12/16/2024  8:41 AM CST -----  Wants abdominal myomectomy  Consents signed today

## 2025-02-05 ENCOUNTER — LAB VISIT (OUTPATIENT)
Dept: LAB | Facility: HOSPITAL | Age: 45
End: 2025-02-05
Attending: OBSTETRICS & GYNECOLOGY
Payer: COMMERCIAL

## 2025-02-05 DIAGNOSIS — D21.9 FIBROIDS: ICD-10-CM

## 2025-02-05 LAB
ANION GAP SERPL CALC-SCNC: 10 MMOL/L (ref 8–16)
BASOPHILS # BLD AUTO: 0.05 K/UL (ref 0–0.2)
BASOPHILS NFR BLD: 0.6 % (ref 0–1.9)
BUN SERPL-MCNC: 14 MG/DL (ref 6–20)
CALCIUM SERPL-MCNC: 9.1 MG/DL (ref 8.7–10.5)
CHLORIDE SERPL-SCNC: 104 MMOL/L (ref 95–110)
CO2 SERPL-SCNC: 24 MMOL/L (ref 23–29)
CREAT SERPL-MCNC: 0.8 MG/DL (ref 0.5–1.4)
DIFFERENTIAL METHOD BLD: ABNORMAL
EOSINOPHIL # BLD AUTO: 0 K/UL (ref 0–0.5)
EOSINOPHIL NFR BLD: 0.2 % (ref 0–8)
ERYTHROCYTE [DISTWIDTH] IN BLOOD BY AUTOMATED COUNT: 12.4 % (ref 11.5–14.5)
EST. GFR  (NO RACE VARIABLE): >60 ML/MIN/1.73 M^2
GLUCOSE SERPL-MCNC: 66 MG/DL (ref 70–110)
HCG INTACT+B SERPL-ACNC: <2.4 MIU/ML
HCT VFR BLD AUTO: 42.3 % (ref 37–48.5)
HGB BLD-MCNC: 13.4 G/DL (ref 12–16)
IMM GRANULOCYTES # BLD AUTO: 0.04 K/UL (ref 0–0.04)
IMM GRANULOCYTES NFR BLD AUTO: 0.5 % (ref 0–0.5)
LYMPHOCYTES # BLD AUTO: 1.9 K/UL (ref 1–4.8)
LYMPHOCYTES NFR BLD: 23 % (ref 18–48)
MCH RBC QN AUTO: 32.1 PG (ref 27–31)
MCHC RBC AUTO-ENTMCNC: 31.7 G/DL (ref 32–36)
MCV RBC AUTO: 101 FL (ref 82–98)
MONOCYTES # BLD AUTO: 0.5 K/UL (ref 0.3–1)
MONOCYTES NFR BLD: 6.5 % (ref 4–15)
NEUTROPHILS # BLD AUTO: 5.6 K/UL (ref 1.8–7.7)
NEUTROPHILS NFR BLD: 69.2 % (ref 38–73)
NRBC BLD-RTO: 0 /100 WBC
PLATELET # BLD AUTO: 380 K/UL (ref 150–450)
PMV BLD AUTO: 9 FL (ref 9.2–12.9)
POTASSIUM SERPL-SCNC: 4 MMOL/L (ref 3.5–5.1)
RBC # BLD AUTO: 4.18 M/UL (ref 4–5.4)
SODIUM SERPL-SCNC: 138 MMOL/L (ref 136–145)
WBC # BLD AUTO: 8.06 K/UL (ref 3.9–12.7)

## 2025-02-05 PROCEDURE — 85025 COMPLETE CBC W/AUTO DIFF WBC: CPT | Performed by: OBSTETRICS & GYNECOLOGY

## 2025-02-05 PROCEDURE — 36415 COLL VENOUS BLD VENIPUNCTURE: CPT | Performed by: OBSTETRICS & GYNECOLOGY

## 2025-02-05 PROCEDURE — 84702 CHORIONIC GONADOTROPIN TEST: CPT | Performed by: OBSTETRICS & GYNECOLOGY

## 2025-02-05 PROCEDURE — 80048 BASIC METABOLIC PNL TOTAL CA: CPT | Performed by: OBSTETRICS & GYNECOLOGY

## 2025-02-06 ENCOUNTER — PATIENT MESSAGE (OUTPATIENT)
Dept: PREADMISSION TESTING | Facility: HOSPITAL | Age: 45
End: 2025-02-06
Payer: COMMERCIAL

## 2025-02-10 ENCOUNTER — TELEPHONE (OUTPATIENT)
Dept: PREADMISSION TESTING | Facility: HOSPITAL | Age: 45
End: 2025-02-10
Payer: COMMERCIAL

## 2025-02-10 ENCOUNTER — PATIENT MESSAGE (OUTPATIENT)
Dept: OBSTETRICS AND GYNECOLOGY | Facility: CLINIC | Age: 45
End: 2025-02-10
Payer: COMMERCIAL

## 2025-02-10 NOTE — TELEPHONE ENCOUNTER
Pre op instructions reviewed with Pt over telephone, verbalized understanding.    Procedure Date: 2/12/25  Arrival Time:  TBD; We will call you after 2pm the day before your procedure with your arrival time.    Address:   [facility] ([facility], 2nd Building on the left)  [address] [place] [address] [place] [address]  >>>Please enter through front entrance Lobby of 1st floor to Registration desk<<<      !!!INSTRUCTIONS IMPORTANT!!!  NO FOOD or tobacco products after midnight the night before surgery! You may have clear liquids up to 3 hrs before your arrival to the Hospital  Clear liquids include Gatorade, water, soda, black coffee or tea (no milk or creamer), and clear juices.  Clear liquids do NOT include anything with pulp or food particles (Chicken broth, ice cream, yogurt, Jello, etc.)    >>>MEDICATION INSTRUCTIONS<<<: Morning of Surgery, take small sip of water with ONLY these medications:  NONE    *ADHD Medication: Stop taking ADHD/ADD medications 48 hrs prior to surgery, as this can affect the anesthesia used. Bariatric surgeries must HOLD 7 Days prior to surgery!    *Diabetic/ Prediabetic Patients: !!!If you take diabetic or weight loss medication, Do NOT take morning of surgery unless instructed by Doctor!!!  Metformin to be stopped 24 hrs prior to surgery.   Long Acting Insulin Instructions: HOLD the night before surgery unless instructed differently by Provider!  Ozempic/ Mounjaro/ Wegovy/ Trulicity/ Semaglutide injections or weight loss medication to be stopped 7 days prior to surgery.    !!!STOP ALL Aspirins, NSAIDS, WEIGHT LOSS INJECTIONS/PILLS, Herbal supplements, & Vitamins 7 DAYS BEFORE SURGERY!!!    ____  Avoid Alcoholic beverages 3 days prior to surgery, as it can thin the blood.  ____  NO Acrylic/fake nails or nail polish worn day of surgery (specifically hand/arm & foot surgeries).  ____  NO powder, lotions, deodorants, oils or cream on body.  ____  Remove all jewelry & piercings  & foreign objects before arrival & leave at home.  ____  Remove Dentures, Hearing Aids & Contact Lens prior to surgery.  ____  Bring photo ID and insurance information to hospital (Leave Valuables at Home).  ____  If going home the same day, arrange for a ride home. You will not be able to drive for 24 hrs if Anesthesia was used.   ____  Females (ages 11-60): may need to give a urine sample the morning of surgery; please see Pre op Nurse prior to using the restroom.  ____  Males: Stop ED medications (Viagra, Cialis) 24 hrs prior to surgery.  ____  Wear clean, loose fitting clothing to allow for dressings/ bandages.      Bathing Instructions:    -Shower with anti-bacterial Soap (Hibiclens or Dial) the night before surgery and the morning of.   -Do not use Hibiclens on your face or genitals.   -Apply clean clothes after shower.  -Do not shave your face or body 2 days prior to surgery unless instructed otherwise by your Surgeon.  -Do not shave pubic hair 7 days prior to surgery (gyn pt's).    [facility]/Ride Policy:  Only 2 adults allowed in pre op/recovery area during your procedure. You MUST HAVE A RIDE HOME from a responsible adult that you know and trust. Medical Transport, Uber or Lyft can ONLY be used if patient has a responsible adult to accompany them during ride home.       *Signs and symptoms of Infection Before or After Surgery:               !!!If you experience any fever, chills, nausea/ vomiting, foul odor/ excessive drainage from surgical site, flu-like symptoms, new wounds or cuts, PLEASE CALL THE SURGEON OFFICE at [phone] or SEND MESSAGE THROUGH [facility] PORTAL!!!     *If you are running late the morning of surgery, please call the Hospital Surgery Dept @ [phone].     *Billing questions:  [phone]     Thank you,  -[facility] Pre Admit Dept.  [phone] or [phone]  M-F 7:30 am-4:00 pm (Closed Major Holidays)

## 2025-02-11 ENCOUNTER — TELEPHONE (OUTPATIENT)
Dept: PREADMISSION TESTING | Facility: HOSPITAL | Age: 45
End: 2025-02-11
Payer: COMMERCIAL

## 2025-02-11 NOTE — TELEPHONE ENCOUNTER
Called and spoke with patient about the following:     Please arrive to [facility] at 7:00am on 2/12/25 for your scheduled procedure.  Address: [address] [place] [address] [place]. [address] (2nd Building on left, 1st Floor Lobby)    !!!NO FOOD after midnight! You may have clear liquids up to 3 hrs before your arrival to the Hospital!!!  Clear liquids include Gatorade, water, soda, black coffee or tea (no milk or creamer), and clear juices.  Clear liquids do NOT include anything with pulp or food particles (Chicken broth, ice cream, yogurt, Jello, etc.)    Thank you,  -[facility] Pre [facility]mit Dept.  Mon-Fri 7:30 am - 4 pm [phone]

## 2025-02-12 ENCOUNTER — ANESTHESIA (OUTPATIENT)
Dept: SURGERY | Facility: HOSPITAL | Age: 45
End: 2025-02-12
Payer: COMMERCIAL

## 2025-02-12 ENCOUNTER — HOSPITAL ENCOUNTER (OUTPATIENT)
Facility: HOSPITAL | Age: 45
Discharge: HOME | End: 2025-02-13
Attending: OBSTETRICS & GYNECOLOGY | Admitting: OBSTETRICS & GYNECOLOGY
Payer: COMMERCIAL

## 2025-02-12 ENCOUNTER — ANESTHESIA EVENT (OUTPATIENT)
Dept: SURGERY | Facility: HOSPITAL | Age: 45
End: 2025-02-12
Payer: COMMERCIAL

## 2025-02-12 DIAGNOSIS — Z98.890 S/P MYOMECTOMY: Primary | ICD-10-CM

## 2025-02-12 DIAGNOSIS — D25.9 UTERINE FIBROID: ICD-10-CM

## 2025-02-12 DIAGNOSIS — D25.1 FIBROIDS, INTRAMURAL: ICD-10-CM

## 2025-02-12 LAB
B-HCG UR QL: NEGATIVE
CTP QC/QA: YES

## 2025-02-12 PROCEDURE — 25000003 PHARM REV CODE 250: Performed by: OBSTETRICS & GYNECOLOGY

## 2025-02-12 PROCEDURE — 58140 MYOMECTOMY ABDOM METHOD: CPT | Mod: ,,, | Performed by: OBSTETRICS & GYNECOLOGY

## 2025-02-12 PROCEDURE — 88305 TISSUE EXAM BY PATHOLOGIST: CPT | Performed by: PATHOLOGY

## 2025-02-12 PROCEDURE — 25000003 PHARM REV CODE 250: Performed by: NURSE ANESTHETIST, CERTIFIED REGISTERED

## 2025-02-12 PROCEDURE — 88305 TISSUE EXAM BY PATHOLOGIST: CPT | Mod: 26,,, | Performed by: PATHOLOGY

## 2025-02-12 PROCEDURE — 36000707: Performed by: OBSTETRICS & GYNECOLOGY

## 2025-02-12 PROCEDURE — 36000706: Performed by: OBSTETRICS & GYNECOLOGY

## 2025-02-12 PROCEDURE — 71000039 HC RECOVERY, EACH ADD'L HOUR: Performed by: OBSTETRICS & GYNECOLOGY

## 2025-02-12 PROCEDURE — 63600175 PHARM REV CODE 636 W HCPCS: Performed by: NURSE ANESTHETIST, CERTIFIED REGISTERED

## 2025-02-12 PROCEDURE — 27800903 OPTIME MED/SURG SUP & DEVICES OTHER IMPLANTS: Performed by: OBSTETRICS & GYNECOLOGY

## 2025-02-12 PROCEDURE — 63600175 PHARM REV CODE 636 W HCPCS: Performed by: OBSTETRICS & GYNECOLOGY

## 2025-02-12 PROCEDURE — 37000009 HC ANESTHESIA EA ADD 15 MINS: Performed by: OBSTETRICS & GYNECOLOGY

## 2025-02-12 PROCEDURE — 81025 URINE PREGNANCY TEST: CPT | Performed by: OBSTETRICS & GYNECOLOGY

## 2025-02-12 PROCEDURE — 37000008 HC ANESTHESIA 1ST 15 MINUTES: Performed by: OBSTETRICS & GYNECOLOGY

## 2025-02-12 PROCEDURE — 71000033 HC RECOVERY, INTIAL HOUR: Performed by: OBSTETRICS & GYNECOLOGY

## 2025-02-12 DEVICE — PATCH AMNION DUAL LAYER 4X6CM: Type: IMPLANTABLE DEVICE | Site: ABDOMEN | Status: FUNCTIONAL

## 2025-02-12 DEVICE — PATCH AMNION DUAL LAYER 4X8CM: Type: IMPLANTABLE DEVICE | Site: ABDOMEN | Status: FUNCTIONAL

## 2025-02-12 RX ORDER — ROCURONIUM BROMIDE 10 MG/ML
INJECTION, SOLUTION INTRAVENOUS
Status: DISCONTINUED | OUTPATIENT
Start: 2025-02-12 | End: 2025-02-12

## 2025-02-12 RX ORDER — CLINDAMYCIN PHOSPHATE 150 MG/ML
900 INJECTION, SOLUTION INTRAVENOUS
Status: DISCONTINUED | OUTPATIENT
Start: 2025-02-12 | End: 2025-02-12

## 2025-02-12 RX ORDER — NORGESTIMATE AND ETHINYL ESTRADIOL 7DAYSX3 28
1 KIT ORAL DAILY
Status: DISCONTINUED | OUTPATIENT
Start: 2025-02-12 | End: 2025-02-12 | Stop reason: RX

## 2025-02-12 RX ORDER — AMOXICILLIN 250 MG
1 CAPSULE ORAL DAILY
Status: DISCONTINUED | OUTPATIENT
Start: 2025-02-12 | End: 2025-02-13 | Stop reason: HOSPADM

## 2025-02-12 RX ORDER — BISACODYL 10 MG/1
10 SUPPOSITORY RECTAL DAILY PRN
Status: DISCONTINUED | OUTPATIENT
Start: 2025-02-12 | End: 2025-02-13 | Stop reason: HOSPADM

## 2025-02-12 RX ORDER — DIPHENHYDRAMINE HCL 25 MG
25 CAPSULE ORAL EVERY 4 HOURS PRN
Status: DISCONTINUED | OUTPATIENT
Start: 2025-02-12 | End: 2025-02-13 | Stop reason: HOSPADM

## 2025-02-12 RX ORDER — ALPRAZOLAM 0.25 MG/1
0.25 TABLET ORAL 4 TIMES DAILY PRN
Status: DISCONTINUED | OUTPATIENT
Start: 2025-02-12 | End: 2025-02-13 | Stop reason: HOSPADM

## 2025-02-12 RX ORDER — ALBUTEROL SULFATE 90 UG/1
2 INHALANT RESPIRATORY (INHALATION) EVERY 4 HOURS PRN
Status: DISCONTINUED | OUTPATIENT
Start: 2025-02-12 | End: 2025-02-12

## 2025-02-12 RX ORDER — CHLORHEXIDINE GLUCONATE ORAL RINSE 1.2 MG/ML
10 SOLUTION DENTAL
Status: DISCONTINUED | OUTPATIENT
Start: 2025-02-12 | End: 2025-02-12

## 2025-02-12 RX ORDER — ONDANSETRON HYDROCHLORIDE 2 MG/ML
4 INJECTION, SOLUTION INTRAVENOUS ONCE AS NEEDED
Status: DISCONTINUED | OUTPATIENT
Start: 2025-02-12 | End: 2025-02-12

## 2025-02-12 RX ORDER — OXYCODONE AND ACETAMINOPHEN 5; 325 MG/1; MG/1
1 TABLET ORAL
Status: DISCONTINUED | OUTPATIENT
Start: 2025-02-12 | End: 2025-02-12

## 2025-02-12 RX ORDER — LIDOCAINE HYDROCHLORIDE 20 MG/ML
INJECTION INTRAVENOUS
Status: DISCONTINUED | OUTPATIENT
Start: 2025-02-12 | End: 2025-02-12

## 2025-02-12 RX ORDER — PHENYLEPHRINE HYDROCHLORIDE 10 MG/ML
INJECTION INTRAVENOUS
Status: DISCONTINUED | OUTPATIENT
Start: 2025-02-12 | End: 2025-02-12

## 2025-02-12 RX ORDER — HYDROMORPHONE HYDROCHLORIDE 1 MG/ML
1 INJECTION, SOLUTION INTRAMUSCULAR; INTRAVENOUS; SUBCUTANEOUS EVERY 6 HOURS PRN
Status: DISCONTINUED | OUTPATIENT
Start: 2025-02-12 | End: 2025-02-13 | Stop reason: HOSPADM

## 2025-02-12 RX ORDER — MIDAZOLAM HYDROCHLORIDE 1 MG/ML
INJECTION INTRAMUSCULAR; INTRAVENOUS
Status: DISCONTINUED | OUTPATIENT
Start: 2025-02-12 | End: 2025-02-12

## 2025-02-12 RX ORDER — OXYCODONE AND ACETAMINOPHEN 10; 325 MG/1; MG/1
1 TABLET ORAL EVERY 4 HOURS PRN
Status: DISCONTINUED | OUTPATIENT
Start: 2025-02-12 | End: 2025-02-13 | Stop reason: HOSPADM

## 2025-02-12 RX ORDER — CHLORHEXIDINE GLUCONATE ORAL RINSE 1.2 MG/ML
10 SOLUTION DENTAL 2 TIMES DAILY
Status: DISCONTINUED | OUTPATIENT
Start: 2025-02-12 | End: 2025-02-13 | Stop reason: HOSPADM

## 2025-02-12 RX ORDER — KETOROLAC TROMETHAMINE 30 MG/ML
30 INJECTION, SOLUTION INTRAMUSCULAR; INTRAVENOUS EVERY 8 HOURS
Status: DISCONTINUED | OUTPATIENT
Start: 2025-02-12 | End: 2025-02-13 | Stop reason: HOSPADM

## 2025-02-12 RX ORDER — DIPHENHYDRAMINE HYDROCHLORIDE 50 MG/ML
25 INJECTION INTRAMUSCULAR; INTRAVENOUS EVERY 4 HOURS PRN
Status: DISCONTINUED | OUTPATIENT
Start: 2025-02-12 | End: 2025-02-13 | Stop reason: HOSPADM

## 2025-02-12 RX ORDER — CLINDAMYCIN PHOSPHATE 900 MG/50ML
900 INJECTION, SOLUTION INTRAVENOUS
Status: COMPLETED | OUTPATIENT
Start: 2025-02-12 | End: 2025-02-12

## 2025-02-12 RX ORDER — CLINDAMYCIN PHOSPHATE 900 MG/50ML
900 INJECTION, SOLUTION INTRAVENOUS
Status: COMPLETED | OUTPATIENT
Start: 2025-02-12 | End: 2025-02-13

## 2025-02-12 RX ORDER — KETOROLAC TROMETHAMINE 30 MG/ML
INJECTION, SOLUTION INTRAMUSCULAR; INTRAVENOUS
Status: DISCONTINUED | OUTPATIENT
Start: 2025-02-12 | End: 2025-02-12

## 2025-02-12 RX ORDER — FENTANYL CITRATE 50 UG/ML
25 INJECTION, SOLUTION INTRAMUSCULAR; INTRAVENOUS EVERY 5 MIN PRN
Status: DISCONTINUED | OUTPATIENT
Start: 2025-02-12 | End: 2025-02-12

## 2025-02-12 RX ORDER — PROPOFOL 10 MG/ML
VIAL (ML) INTRAVENOUS
Status: DISCONTINUED | OUTPATIENT
Start: 2025-02-12 | End: 2025-02-12

## 2025-02-12 RX ORDER — DOCUSATE SODIUM 100 MG/1
100 CAPSULE, LIQUID FILLED ORAL 2 TIMES DAILY
Status: DISCONTINUED | OUTPATIENT
Start: 2025-02-12 | End: 2025-02-13 | Stop reason: HOSPADM

## 2025-02-12 RX ORDER — ACETAMINOPHEN 500 MG
1000 TABLET ORAL
Status: COMPLETED | OUTPATIENT
Start: 2025-02-12 | End: 2025-02-12

## 2025-02-12 RX ORDER — FENTANYL CITRATE 50 UG/ML
INJECTION, SOLUTION INTRAMUSCULAR; INTRAVENOUS
Status: DISCONTINUED | OUTPATIENT
Start: 2025-02-12 | End: 2025-02-12

## 2025-02-12 RX ORDER — FAMOTIDINE 20 MG/1
20 TABLET, FILM COATED ORAL
Status: COMPLETED | OUTPATIENT
Start: 2025-02-12 | End: 2025-02-12

## 2025-02-12 RX ORDER — HYDROMORPHONE HYDROCHLORIDE 1 MG/ML
0.2 INJECTION, SOLUTION INTRAMUSCULAR; INTRAVENOUS; SUBCUTANEOUS EVERY 5 MIN PRN
Status: DISCONTINUED | OUTPATIENT
Start: 2025-02-12 | End: 2025-02-12

## 2025-02-12 RX ORDER — ALBUTEROL SULFATE 0.83 MG/ML
2.5 SOLUTION RESPIRATORY (INHALATION) EVERY 4 HOURS PRN
Status: DISCONTINUED | OUTPATIENT
Start: 2025-02-12 | End: 2025-02-13 | Stop reason: HOSPADM

## 2025-02-12 RX ORDER — ONDANSETRON 8 MG/1
8 TABLET, ORALLY DISINTEGRATING ORAL EVERY 8 HOURS PRN
Status: DISCONTINUED | OUTPATIENT
Start: 2025-02-12 | End: 2025-02-13 | Stop reason: HOSPADM

## 2025-02-12 RX ORDER — IBUPROFEN 200 MG
800 TABLET ORAL 3 TIMES DAILY
Status: DISCONTINUED | OUTPATIENT
Start: 2025-02-14 | End: 2025-02-13 | Stop reason: HOSPADM

## 2025-02-12 RX ORDER — ONDANSETRON HYDROCHLORIDE 2 MG/ML
4 INJECTION, SOLUTION INTRAVENOUS DAILY PRN
Status: DISCONTINUED | OUTPATIENT
Start: 2025-02-12 | End: 2025-02-12

## 2025-02-12 RX ORDER — DEXAMETHASONE SODIUM PHOSPHATE 4 MG/ML
INJECTION, SOLUTION INTRA-ARTICULAR; INTRALESIONAL; INTRAMUSCULAR; INTRAVENOUS; SOFT TISSUE
Status: DISCONTINUED | OUTPATIENT
Start: 2025-02-12 | End: 2025-02-12

## 2025-02-12 RX ORDER — SODIUM CHLORIDE 9 MG/ML
INJECTION, SOLUTION INTRAVENOUS CONTINUOUS
Status: DISCONTINUED | OUTPATIENT
Start: 2025-02-12 | End: 2025-02-12

## 2025-02-12 RX ORDER — SUCCINYLCHOLINE CHLORIDE 20 MG/ML
INJECTION INTRAMUSCULAR; INTRAVENOUS
Status: DISCONTINUED | OUTPATIENT
Start: 2025-02-12 | End: 2025-02-12

## 2025-02-12 RX ORDER — OXYCODONE AND ACETAMINOPHEN 5; 325 MG/1; MG/1
1 TABLET ORAL EVERY 4 HOURS PRN
Status: DISCONTINUED | OUTPATIENT
Start: 2025-02-12 | End: 2025-02-13 | Stop reason: HOSPADM

## 2025-02-12 RX ORDER — ONDANSETRON HYDROCHLORIDE 2 MG/ML
INJECTION, SOLUTION INTRAVENOUS
Status: DISCONTINUED | OUTPATIENT
Start: 2025-02-12 | End: 2025-02-12

## 2025-02-12 RX ADMIN — ROCURONIUM BROMIDE 5 MG: 10 SOLUTION INTRAVENOUS at 08:02

## 2025-02-12 RX ADMIN — MIDAZOLAM HYDROCHLORIDE 2 MG: 1 INJECTION, SOLUTION INTRAMUSCULAR; INTRAVENOUS at 08:02

## 2025-02-12 RX ADMIN — PHENYLEPHRINE HYDROCHLORIDE 200 MCG: 10 INJECTION INTRAVENOUS at 09:02

## 2025-02-12 RX ADMIN — CHLORHEXIDINE GLUCONATE 0.12% ORAL RINSE 10 ML: 1.2 LIQUID ORAL at 01:02

## 2025-02-12 RX ADMIN — PHENYLEPHRINE HYDROCHLORIDE 300 MCG: 10 INJECTION INTRAVENOUS at 09:02

## 2025-02-12 RX ADMIN — SUCCINYLCHOLINE CHLORIDE 140 MG: 20 INJECTION, SOLUTION INTRAMUSCULAR; INTRAVENOUS; PARENTERAL at 08:02

## 2025-02-12 RX ADMIN — ONDANSETRON 4 MG: 2 INJECTION INTRAMUSCULAR; INTRAVENOUS at 09:02

## 2025-02-12 RX ADMIN — SUGAMMADEX 200 MG: 100 INJECTION, SOLUTION INTRAVENOUS at 09:02

## 2025-02-12 RX ADMIN — CLINDAMYCIN PHOSPHATE 900 MG: 900 INJECTION, SOLUTION INTRAVENOUS at 08:02

## 2025-02-12 RX ADMIN — DEXAMETHASONE SODIUM PHOSPHATE 8 MG: 4 INJECTION, SOLUTION INTRA-ARTICULAR; INTRALESIONAL; INTRAMUSCULAR; INTRAVENOUS; SOFT TISSUE at 08:02

## 2025-02-12 RX ADMIN — PROPOFOL 150 MG: 10 INJECTION, EMULSION INTRAVENOUS at 08:02

## 2025-02-12 RX ADMIN — CLINDAMYCIN PHOSPHATE 900 MG: 900 INJECTION, SOLUTION INTRAVENOUS at 05:02

## 2025-02-12 RX ADMIN — LIDOCAINE HYDROCHLORIDE 100 MG: 20 INJECTION INTRAVENOUS at 08:02

## 2025-02-12 RX ADMIN — FENTANYL CITRATE 100 MCG: 50 INJECTION, SOLUTION INTRAMUSCULAR; INTRAVENOUS at 08:02

## 2025-02-12 RX ADMIN — PHENYLEPHRINE HYDROCHLORIDE 200 MCG: 10 INJECTION INTRAVENOUS at 08:02

## 2025-02-12 RX ADMIN — ROCURONIUM BROMIDE 35 MG: 10 SOLUTION INTRAVENOUS at 08:02

## 2025-02-12 RX ADMIN — CHLORHEXIDINE GLUCONATE 0.12% ORAL RINSE 10 ML: 1.2 LIQUID ORAL at 09:02

## 2025-02-12 RX ADMIN — DOCUSATE SODIUM 100 MG: 100 CAPSULE, LIQUID FILLED ORAL at 09:02

## 2025-02-12 RX ADMIN — DOCUSATE SODIUM 100 MG: 100 CAPSULE, LIQUID FILLED ORAL at 01:02

## 2025-02-12 RX ADMIN — CHLORHEXIDINE GLUCONATE 0.12% ORAL RINSE 10 ML: 1.2 LIQUID ORAL at 08:02

## 2025-02-12 RX ADMIN — KETOROLAC TROMETHAMINE 30 MG: 30 INJECTION, SOLUTION INTRAMUSCULAR; INTRAVENOUS at 09:02

## 2025-02-12 RX ADMIN — GENTAMICIN SULFATE 255.6 MG: 40 INJECTION, SOLUTION INTRAMUSCULAR; INTRAVENOUS at 08:02

## 2025-02-12 RX ADMIN — KETOROLAC TROMETHAMINE 30 MG: 30 INJECTION, SOLUTION INTRAMUSCULAR at 10:02

## 2025-02-12 RX ADMIN — SODIUM CHLORIDE, SODIUM LACTATE, POTASSIUM CHLORIDE, AND CALCIUM CHLORIDE: .6; .31; .03; .02 INJECTION, SOLUTION INTRAVENOUS at 08:02

## 2025-02-12 RX ADMIN — KETOROLAC TROMETHAMINE 30 MG: 30 INJECTION, SOLUTION INTRAMUSCULAR at 04:02

## 2025-02-12 RX ADMIN — ACETAMINOPHEN 1000 MG: 500 TABLET ORAL at 08:02

## 2025-02-12 RX ADMIN — FAMOTIDINE 20 MG: 20 TABLET, FILM COATED ORAL at 08:02

## 2025-02-12 NOTE — TRANSFER OF CARE
"Anesthesia Transfer of Care Note    Patient: Purnima Davis    Procedure(s) Performed: Procedure(s) (LRB):  MYOMECTOMY (N/A)    Patient location: PACU    Anesthesia Type: general    Transport from OR: Transported from OR on room air with adequate spontaneous ventilation    Post pain: adequate analgesia    Post assessment: no apparent anesthetic complications and tolerated procedure well    Post vital signs: stable    Level of consciousness: awake    Nausea/Vomiting: no nausea/vomiting    Complications: none    Transfer of care protocol was followed    Last vitals: Visit Vitals  /76 (BP Location: Right arm, Patient Position: Sitting)   Pulse 62   Temp 36.9 °C (98.4 °F) (Temporal)   Resp 20   Ht 5' 2" (1.575 m)   Wt 51.3 kg (113 lb)   LMP 02/03/2025   SpO2 100%   Breastfeeding No   BMI 20.67 kg/m²     "

## 2025-02-12 NOTE — PLAN OF CARE
[facility]  Initial Discharge Assessment       Primary Care Provider: No, Primary Doctor    Admission Diagnosis: Fibroids [D21.9]  Uterine fibroid [D25.9]  S/P myomectomy [Z98.890]    Admission Date: 2/12/2025  Expected Discharge Date: Per Attending     Transition of Care Barriers: None    Payor: BLUE CROSS BLUE SHIELD / Plan: BCBS FEDERAL BASIC / Product Type: PPO /     No emergency contact information on file.    Discharge Plan A: Home         [facility] [address]  [address] [facility] [address] [facility] [address] [facility] [address]  Phone: [phone] Fax: [phone]      Initial Assessment (most recent)       Adult Discharge Assessment - 02/12/25 1356          Discharge Assessment    Assessment Type Discharge Planning Assessment     Confirmed/corrected address, phone number and insurance Yes     Confirmed Demographics Correct on Facesheet     Source of Information patient     Communicated HAYLEY with patient/caregiver Date not available/Unable to determine     People in Home alone     Do you expect to return to your current living situation? Yes     Do you have help at home or someone to help you manage your care at home? No     Prior to hospitilization cognitive status: Alert/Oriented     Current cognitive status: Alert/Oriented     Walking or Climbing Stairs Difficulty no     Dressing/Bathing Difficulty no     Home Layout Able to live on 1st floor     Equipment Currently Used at Home none     Readmission within 30 days? No     Patient currently being followed by outpatient case management? No     Do you currently have service(s) that help you manage your care at home? No     Do you take prescription medications? Yes     Do you have prescription coverage? Yes     Coverage BCBS     Do you have any problems affording any of your prescribed medications? No     Is the patient taking medications as prescribed? yes     Who is going to help you get home at discharge? TBD     How do  you get to doctors appointments? car, drives self     Are you on dialysis? No     Do you take coumadin? No     Discharge Plan A Home     DME Needed Upon Discharge  none     Discharge Plan discussed with: Patient     Transition of Care Barriers None                   Patient has no d/c needs at this time. Sw to follow up, as needed, for d/c planning purposes.

## 2025-02-12 NOTE — ASSESSMENT & PLAN NOTE
02/12/2025  Prevously reviewed abdominal myomectomy procedure in detail.   Reviewed risks including but not limited to infection, bleeding, scar tissue formation  damage to fallopian tubes/bowel/bladder/ureter, cva;htn, dvt, death.  Pt aware will need c/section as mode of delivery.     Patient  Is aware that fibroids will regrow.  All questions answered to the best of my ability.   Consents previously signed and witnessed.   Post op course reviewed.    Hgb 13.4  Upt negative

## 2025-02-12 NOTE — H&P
[facility] ([facility])  Obstetrics & Gynecology  History & Physical    Patient Name: Purnima Davis  MRN: [id number]  Admission Date: 2025  Primary Care Provider: Una, Primary Doctor    Subjective:     Chief Complaint/Reason for Admission: symtpomatic uterine fibroids    History of Present Illness:  43 y/o g0 with symptomatic uterine fibroids        OB History    Para Term  AB Living   0 0 0 0 0 0   SAB IAB Ectopic Multiple Live Births   0 0 0 0 0     History reviewed. No pertinent past medical history.  Past Surgical History:   Procedure Laterality Date    CLAVICLE SURGERY         PTA Medications   Medication Sig    albuterol (PROVENTIL/VENTOLIN HFA) 90 mcg/actuation inhaler INHALE 2 PUFFS BY MOUTH UP TO 4 TIMES DAILY IF NEEDED FOR QUICK RELIEF ONLY (MAY TAKE 1-2 PUFFS BEFORE EXERCISE)    ALPRAZolam (XANAX) 0.25 MG tablet Take 0.25 mg by mouth daily as needed.    norgestimate-ethinyl estradioL (ORTHO TRI-CYCLEN,TRI-SPRINTEC) 0.18/0.215/0.25 mg-35 mcg (28) tablet Take 1 tablet by mouth once daily. (Patient taking differently: Take 1 tablet by mouth every evening.)    fluticasone-umeclidin-vilanter (TRELEGY ELLIPTA) 100-62.5-25 mcg DsDv INHALE 1 PUFF ONCE DAILY RINSE MOUTH AFTER USE       Review of patient's allergies indicates:   Allergen Reactions    Penicillins Anaphylaxis    Percocet [oxycodone-acetaminophen] Nausea Only    Amoxicillin         Family History       Problem Relation (Age of Onset)    Skin cancer Father          Tobacco Use    Smoking status: Never    Smokeless tobacco: Never   Substance and Sexual Activity    Alcohol use: Yes     Comment: socially    Drug use: No    Sexual activity: Yes     Partners: Male     Birth control/protection: OCP     Review of Systems   Genitourinary:  Positive for pelvic pain.   All other systems reviewed and are negative.     Objective:     Vital Signs (Most Recent):    Vital Signs (24h Range):        Weight: 52.6 kg (116 lb)  Body mass index  is 21.22 kg/m².    Patient's last menstrual period was 02/03/2025.     Physical Exam:   Constitutional: She is oriented to person, place, and time. She appears well-developed.     Eyes: Pupils are equal, round, and reactive to light. Conjunctivae and EOM are normal.      Pulmonary/Chest: Effort normal.        Abdominal: Soft.             Musculoskeletal: Normal range of motion.       Neurological: She is alert and oriented to person, place, and time.    Skin: Skin is warm.    Psychiatric: She has a normal mood and affect.        Laboratory:  Recent Lab Results         02/12/25  0756        hCG Qualitative, Urine Negative        Acceptable Yes             I have personallly reviewed all pertinent lab results from the last 24 hours.    Diagnostic Results:  US: Reviewed  Assessment/Plan:     Renal/  * Fibroids, intramural  02/12/2025  Prevously reviewed abdominal myomectomy procedure in detail.   Reviewed risks including but not limited to infection, bleeding, scar tissue formation  damage to fallopian tubes/bowel/bladder/ureter, cva;htn, dvt, death.  Pt aware will need c/section as mode of delivery.     Patient  Is aware that fibroids will regrow.  All questions answered to the best of my ability.   Consents previously signed and witnessed.   Post op course reviewed.    Hgb 13.4  Upt negative            Lakesha Calderon MD  Obstetrics & Gynecology  [facility] ([facility])

## 2025-02-12 NOTE — ANESTHESIA PROCEDURE NOTES
Intubation    Date/Time: 2/12/2025 8:38 AM    Performed by: Pilo Martin CRNA  Authorized by: Kathy Langford MD    Intubation:     Induction:  Intravenous    Intubated:  Postinduction    Mask Ventilation:  Easy mask    Attempts:  1    Attempted By:  CRNA    Method of Intubation:  Direct    Blade:  Kaba 2    Laryngeal View Grade: Grade I - full view of cords      Difficult Airway Encountered?: No      Complications:  None    Airway Device:  Oral endotracheal tube    Airway Device Size:  7.5    Style/Cuff Inflation:  Cuffed (inflated to minimal occlusive pressure)    Tube secured:  20    Secured at:  The lips    Placement Verified By:  Capnometry    Complicating Factors:  Retrognathia    Findings Post-Intubation:  BS equal bilateral and atraumatic/condition of teeth unchanged

## 2025-02-12 NOTE — ANESTHESIA PREPROCEDURE EVALUATION
02/12/2025  Purnima Davis is a 44 y.o., female.      Pre-op Assessment    I have reviewed the Patient Summary Reports.     I have reviewed the Nursing Notes. I have reviewed the NPO Status.      Review of Systems  Anesthesia Hx:  No problems with previous Anesthesia                Hematology/Oncology:  Hematology Normal   Oncology Normal                                   Renal/:  Renal/ Normal                 Hepatic/GI:  Hepatic/GI Normal                    Neurological:  Neurology Normal                                      Endocrine:  Endocrine Normal            Dermatological:  Skin Normal    Psych:  Psychiatric Normal                There is no problem list on file for this patient.        Physical Exam  General: Well nourished, Cooperative, Alert and Oriented    Airway:  Mallampati: II / II  Mouth Opening: Normal  TM Distance: Normal  Tongue: Normal  Neck ROM: Normal ROM    Dental:  Intact        Anesthesia Plan  Type of Anesthesia, risks & benefits discussed:    Anesthesia Type: Gen ETT  Intra-op Monitoring Plan: Standard ASA Monitors  Post Op Pain Control Plan: multimodal analgesia  Induction:  IV  Airway Plan: Direct  Informed Consent: Informed consent signed with the Patient and all parties understand the risks and agree with anesthesia plan.  All questions answered.   ASA Score: 2  Day of Surgery Review of History & Physical: H&P Update referred to the surgeon/provider.I have interviewed and examined the patient. I have reviewed the patient's H&P dated: There are no significant changes. H&P completed by Anesthesiologist.    Ready For Surgery From Anesthesia Perspective.     .

## 2025-02-12 NOTE — OP NOTE
[facility] ([facility])  General Surgery  Operative Note    SUMMARY     Date of Procedure: 2/12/2025     Procedure: Procedure(s) (LRB):  MYOMECTOMY (N/A)     Implantation of allograft tissue (cpt 90442)  Surgeons and Role:     * Lakesha Calderon MD - Primary    Assistant:  desiree rodriguez    Pre-Operative Diagnosis: Fibroids [D21.9]    Post-Operative Diagnosis: Post-Op Diagnosis Codes:     * Fibroids [D21.9]    Anesthesia: General    Operative Findings (including complications, if any): 16 wk size uterus; 8 cm anterior and posterior fundal fibroid , 3 cm right broad ligament fibroid    Description of Technical Procedures: abdominal myomectomy    Summary    General anesthesia induced without difficulty  Once under anesthesia, patient is positioned in stirrups and the vagina prepped with betadine solution and Arroyo Catheter placed.   Positioned in supine position, abdominal prep with Choroprep, allowed to dry for 3 minutes, sterile drapes then applied  Time out taken with all members of the OR team    A pfanesteil  Skin incision was made approximately 2 cm above the symphis pubis and carried down sharply to the fascia.  Fascia was knicked in the midline and extended laterally.  Superior aspect of fascial incision was taken down sharply with cautery.  Attention was then turned to the lower aspect of the incision and rectus muscles taken down sharply.  Rectus muscles  in the midline,  the peritoneum was identified and entered bluntly.  This was extended superiorly and inferiorly with good visualization of the bladder.     The uterus was  delivered  through the incision.     2 large fibroids were noted, 1 anterior and 1 posterior.  A dilute solution of pitressin was injected along the length of the anterior fibroid and into the fibroid.  Bovie was used to cauterize over the fibroid being careful to keep the incision midline.  A leahey clamp was placed and with blunt and sharp dissection the myomectomy was  accomplished.   The myoma was handed off.    The uterine defect was closed in layers using 2-0 v-lock suture until the serosal layer.  The serosal layer was closed with 3-0 monocryl .  Excellent hemostasis was noted.      Attention was then turned to the posterior fibroid.  A dilute solution of pitressin was injected along the length of the anterior fibroid and into the fibroid.  Bovie was used to cauterize over the fibroid being careful to keep the incision midline.  A leahey clamp was placed and with blunt and sharp dissection the myomectomy was accomplished.   The myoma was handed off.  The uterine defect was closed in layers using 2-0 v-lock suture until the serosal layer.  The serosal layer was closed with 3-0 monocryl.  Excellent hemostasis was noted.      The anterior leaf of the broad ligament was incised and the fibroid removed.  The base of the fibroid clamped and oversewn with danial stitch of 0 vicryl x2, hemostasis was noted.    The pelvis was copiously irrigated with normal saline.   All incision sites were hemostatic.  Implantation of allograft tissue (azam patch duo layer amnion) was placed over the anterior and posterior fundal incisions.  The uterus was returned to the abdomen.    Laps are removed, count noted to be correct.    Peritoneum closed with 2-0 vicryl.    Subfascial spaces where made hemostatic with cautery.  The fascia was closed with #0  vicryl in a running fashion after locking the first.   The subcutaneous tissue was copiously irrigated with normal saline and the  The skin was then closed with 4 0 monocryl subcuticular stitch.    Aquasel dressing was placed over the incision    Dressing placed, patient awakened and taken to PACU with pop catheter in place.      Significant Surgical Tasks Conducted by the Assistant(s), if Applicable: 1st assist    Estimated Blood Loss (EBL): * No values recorded between 2/12/2025  8:51 AM and 2/12/2025 10:20 AM *50cc           Implants:   Implant Name  Type Inv. Item Serial No.  Lot No. LRB No. Used Action   [id number]  PATCH AMNION DUAL LAYER 4X8CM [id number] [employer] SKIP  N/A 1 Implanted   [id number]  PATCH AMNION DUAL LAYER 4X6CM [id number] [employer] SKIP  N/A 1 Implanted       Specimens:   Specimen (24h ago, onward)       Start     Ordered    02/12/25 0947  Specimen to Pathology, Surgery Gynecology and Obstetrics  Once        Comments: Pre-op Diagnosis: Fibroids [D21.9]Procedure(s):MYOMECTOMY Number of specimens: 1Name of specimens: 1) uterine Fibroids PERM     References:    Click here for ordering Quick Tip   Question Answer Comment   Procedure Type: Gynecology and Obstetrics    Release to patient Immediate        02/12/25 0918                            Condition: Good    Disposition: PACU - hemodynamically stable.    Attestation: I performed the procedure.

## 2025-02-12 NOTE — SUBJECTIVE & OBJECTIVE
OB History    Para Term  AB Living   0 0 0 0 0 0   SAB IAB Ectopic Multiple Live Births   0 0 0 0 0     History reviewed. No pertinent past medical history.  Past Surgical History:   Procedure Laterality Date    CLAVICLE SURGERY         PTA Medications   Medication Sig    albuterol (PROVENTIL/VENTOLIN HFA) 90 mcg/actuation inhaler INHALE 2 PUFFS BY MOUTH UP TO 4 TIMES DAILY IF NEEDED FOR QUICK RELIEF ONLY (MAY TAKE 1-2 PUFFS BEFORE EXERCISE)    ALPRAZolam (XANAX) 0.25 MG tablet Take 0.25 mg by mouth daily as needed.    norgestimate-ethinyl estradioL (ORTHO TRI-CYCLEN,TRI-SPRINTEC) 0.18/0.215/0.25 mg-35 mcg (28) tablet Take 1 tablet by mouth once daily. (Patient taking differently: Take 1 tablet by mouth every evening.)    fluticasone-umeclidin-vilanter (TRELEGY ELLIPTA) 100-62.5-25 mcg DsDv INHALE 1 PUFF ONCE DAILY RINSE MOUTH AFTER USE       Review of patient's allergies indicates:   Allergen Reactions    Penicillins Anaphylaxis    Percocet [oxycodone-acetaminophen] Nausea Only    Amoxicillin         Family History       Problem Relation (Age of Onset)    Skin cancer Father          Tobacco Use    Smoking status: Never    Smokeless tobacco: Never   Substance and Sexual Activity    Alcohol use: Yes     Comment: socially    Drug use: No    Sexual activity: Yes     Partners: Male     Birth control/protection: OCP     Review of Systems   Genitourinary:  Positive for pelvic pain.   All other systems reviewed and are negative.     Objective:     Vital Signs (Most Recent):    Vital Signs (24h Range):        Weight: 52.6 kg (116 lb)  Body mass index is 21.22 kg/m².    Patient's last menstrual period was 2025.     Physical Exam:   Constitutional: She is oriented to person, place, and time. She appears well-developed.     Eyes: Pupils are equal, round, and reactive to light. Conjunctivae and EOM are normal.      Pulmonary/Chest: Effort normal.        Abdominal: Soft.             Musculoskeletal:  Normal range of motion.       Neurological: She is alert and oriented to person, place, and time.    Skin: Skin is warm.    Psychiatric: She has a normal mood and affect.        Laboratory:  Recent Lab Results         02/12/25  0756        hCG Qualitative, Urine Negative        Acceptable Yes             I have personallly reviewed all pertinent lab results from the last 24 hours.    Diagnostic Results:  US: Reviewed

## 2025-02-13 VITALS
SYSTOLIC BLOOD PRESSURE: 94 MMHG | WEIGHT: 111.56 LBS | OXYGEN SATURATION: 100 % | HEIGHT: 62 IN | BODY MASS INDEX: 20.53 KG/M2 | HEART RATE: 66 BPM | TEMPERATURE: 98 F | RESPIRATION RATE: 18 BRPM | DIASTOLIC BLOOD PRESSURE: 56 MMHG

## 2025-02-13 PROBLEM — D25.1 FIBROIDS, INTRAMURAL: Status: RESOLVED | Noted: 2025-02-12 | Resolved: 2025-02-13

## 2025-02-13 PROCEDURE — 25000003 PHARM REV CODE 250: Performed by: OBSTETRICS & GYNECOLOGY

## 2025-02-13 PROCEDURE — 94761 N-INVAS EAR/PLS OXIMETRY MLT: CPT

## 2025-02-13 PROCEDURE — 99900035 HC TECH TIME PER 15 MIN (STAT)

## 2025-02-13 PROCEDURE — 63600175 PHARM REV CODE 636 W HCPCS: Performed by: OBSTETRICS & GYNECOLOGY

## 2025-02-13 RX ORDER — IBUPROFEN 800 MG/1
800 TABLET ORAL 3 TIMES DAILY
Qty: 30 TABLET | Refills: 0 | Status: SHIPPED | OUTPATIENT
Start: 2025-02-14

## 2025-02-13 RX ORDER — OXYCODONE AND ACETAMINOPHEN 5; 325 MG/1; MG/1
1 TABLET ORAL EVERY 6 HOURS PRN
Qty: 15 TABLET | Refills: 0 | Status: SHIPPED | OUTPATIENT
Start: 2025-02-13 | End: 2025-02-19

## 2025-02-13 RX ORDER — DOCUSATE SODIUM 100 MG/1
100 CAPSULE, LIQUID FILLED ORAL 2 TIMES DAILY
Qty: 60 CAPSULE | Refills: 0 | Status: SHIPPED | OUTPATIENT
Start: 2025-02-13

## 2025-02-13 RX ADMIN — KETOROLAC TROMETHAMINE 30 MG: 30 INJECTION, SOLUTION INTRAMUSCULAR at 05:02

## 2025-02-13 RX ADMIN — CHLORHEXIDINE GLUCONATE 0.12% ORAL RINSE 10 ML: 1.2 LIQUID ORAL at 08:02

## 2025-02-13 RX ADMIN — CLINDAMYCIN PHOSPHATE 900 MG: 900 INJECTION, SOLUTION INTRAVENOUS at 02:02

## 2025-02-13 RX ADMIN — DOCUSATE SODIUM 100 MG: 100 CAPSULE, LIQUID FILLED ORAL at 08:02

## 2025-02-13 NOTE — PLAN OF CARE
Free from falls. Ambulated in hallway. IV abx maintained. Arroyo cath to be D/C. No s/s of acute distress.

## 2025-02-13 NOTE — PLAN OF CARE
[facility]  Discharge Final Note    Primary Care Provider: No, Primary Doctor    Expected Discharge Date: 2/13/2025    Final Discharge Note (most recent)       Final Note - 02/13/25 0835          Final Note    Assessment Type Final Discharge Note     Anticipated Discharge Disposition Home or Self Care     Hospital Resources/Appts/Education Provided Appointments scheduled and added to AVS        Post-Acute Status    Discharge Delays None known at this time                   Patient has no d/c needs at this time. Sw to follow up, as needed, for d/c planning purposes.

## 2025-02-13 NOTE — ASSESSMENT & PLAN NOTE
Underwent abdominal myomectomy on 2/12/25.  - POD 1  - Tolerating PO  - Pain managed on current regimen  - Ambulating  - Voiding spontaneously  - Doing well, ready for discharge

## 2025-02-13 NOTE — ANESTHESIA POSTPROCEDURE EVALUATION
Anesthesia Post Evaluation    Patient: Purnima Davis    Procedure(s) Performed: Procedure(s) (LRB):  MYOMECTOMY (N/A)    Final Anesthesia Type: general      Patient location during evaluation: PACU  Patient participation: Yes- Able to Participate  Level of consciousness: awake and alert, oriented and awake  Post-procedure vital signs: reviewed and stable  Pain management: adequate  Airway patency: patent    PONV status at discharge: No PONV  Anesthetic complications: no      Cardiovascular status: blood pressure returned to baseline  Respiratory status: unassisted  Hydration status: euvolemic  Follow-up not needed.              Vitals Value Taken Time   /63 02/13/25 0457   Temp 36.7 °C (98 °F) 02/13/25 0457   Pulse 65 02/13/25 0457   Resp 18 02/13/25 0457   SpO2 99 % 02/13/25 0457         Event Time   Out of Recovery 12:00:00         Pain/Fredy Score: Pain Rating Prior to Med Admin: 6 (2/13/2025  5:29 AM)  Pain Rating Post Med Admin: 2 (2/13/2025  5:59 AM)

## 2025-02-13 NOTE — DISCHARGE SUMMARY
"[facility]  Obstetrics & Gynecology  Discharge Summary    Patient Name: Purnima Davis  MRN: [id number]  Admission Date: 2/12/2025  Hospital Length of Stay: 0 days  Discharge Date and Time:  02/13/2025 8:04 AM  Attending Physician: Lakesha Calderon MD   Discharging Provider: Danitza Herbert PA-C  Primary Care Provider: Una, Primary Doctor    HPI:  45 y/o g0 with symptomatic uterine fibroids    Hospital Course:  02/12/2025--abdominal myomectomy  2/13: POD 1, doing well, meeting discharge criteria    Goals of Care Treatment Preferences:  Code Status: Full Code      Procedure(s) (LRB):  MYOMECTOMY (N/A)         Significant Diagnostic Studies: Labs: BMP: No results for input(s): "GLU", "NA", "K", "CL", "CO2", "BUN", "CREATININE", "CALCIUM", "MG" in the last 48 hours., CMP No results for input(s): "NA", "K", "CL", "CO2", "GLU", "BUN", "CREATININE", "CALCIUM", "PROT", "ALBUMIN", "BILITOT", "ALKPHOS", "AST", "ALT", "ANIONGAP", "ESTGFRAFRICA", "EGFRNONAA" in the last 48 hours., CBC No results for input(s): "WBC", "HGB", "HCT", "PLT" in the last 48 hours., and All labs within the past 24 hours have been reviewed      Pending Diagnostic Studies:       Procedure Component Value Units Date/Time    Specimen to Pathology, Surgery Gynecology and Obstetrics [[id number]] Collected: 02/12/25 0955    Order Status: Sent Lab Status: In process Updated: 02/12/25 1012    Specimen: Tissue           Final Active Diagnoses:    Diagnosis Date Noted POA    PRINCIPAL PROBLEM:  S/P myomectomy [Z98.890] 02/12/2025 Not Applicable      Problems Resolved During this Admission:    Diagnosis Date Noted Date Resolved POA    Fibroids, intramural [D25.1] 02/12/2025 02/13/2025 Yes        Discharged Condition: good    Disposition: Home or Self Care    Follow Up:    Patient Instructions:      Diet Adult Regular     Lifting restrictions   Order Comments: No lifting anything over 15lbs for the next 6 weeks.     No driving until:   Order Comments: " No driving while taking pain medication.     Pelvic Rest   Order Comments: No tampon use, douching, or intercourse for 6 weeks.     Leave dressing on - Keep it clean, dry, and intact until clinic visit   Order Comments: Dressing will be removed at 1 week nurse visit.  Showers only- no baths for 6 weeks.     Notify your health care provider if you experience any of the following:  temperature >100.4     Notify your health care provider if you experience any of the following:  severe uncontrolled pain     Notify your health care provider if you experience any of the following:  redness, tenderness, or signs of infection (pain, swelling, redness, odor or green/yellow discharge around incision site)     Notify your health care provider if you experience any of the following:  difficulty breathing or increased cough     Notify your health care provider if you experience any of the following:  severe persistent headache     Notify your health care provider if you experience any of the following:  worsening rash     Notify your health care provider if you experience any of the following:  persistent dizziness, light-headedness, or visual disturbances     Notify your health care provider if you experience any of the following:  increased confusion or weakness     Medications:  Reconciled Home Medications:      Medication List        START taking these medications      docusate sodium 100 MG capsule  Commonly known as: COLACE  Take 1 capsule (100 mg total) by mouth 2 (two) times daily.     ibuprofen 800 MG tablet  Commonly known as: ADVIL,MOTRIN  Take 1 tablet (800 mg total) by mouth 3 (three) times daily.  Start taking on: February 14, 2025     oxyCODONE-acetaminophen 5-325 mg per tablet  Commonly known as: PERCOCET  Take 1 tablet by mouth every 6 (six) hours as needed for Pain.            CHANGE how you take these medications      norgestimate-ethinyl estradioL 0.18/0.215/0.25 mg-35 mcg (28) tablet  Commonly known as:  ORTHO TRI-CYCLEN,TRI-SPRINTEC  Take 1 tablet by mouth once daily.  What changed: when to take this            CONTINUE taking these medications      albuterol 90 mcg/actuation inhaler  Commonly known as: PROVENTIL/VENTOLIN HFA  INHALE 2 PUFFS BY MOUTH UP TO 4 TIMES DAILY IF NEEDED FOR QUICK RELIEF ONLY (MAY TAKE 1-2 PUFFS BEFORE EXERCISE)     ALPRAZolam 0.25 MG tablet  Commonly known as: XANAX  Take 0.25 mg by mouth daily as needed.     TRELEGY ELLIPTA 100-62.5-25 mcg Dsdv  Generic drug: fluticasone-umeclidin-vilanter  INHALE 1 PUFF ONCE DAILY RINSE MOUTH AFTER USE              Danitza Herbert PA-C  Obstetrics & Gynecology  [facility]

## 2025-02-13 NOTE — PROGRESS NOTES
[facility]  Obstetrics & Gynecology  Progress Note    Patient Name: Purnima Davis  MRN: [id number]  Admission Date: 2/12/2025  Primary Care Provider: Una, Primary Doctor  Principal Problem: S/P myomectomy    Subjective:     HPI:  43 y/o g0 with symptomatic uterine fibroids    Interval History: POD 1 s/p abdominal myomectomy. Ambulating, tolerating PO, pain well managed. Needs to void one more time prior to discharge.     Scheduled Meds:   chlorhexidine  10 mL Mouth/Throat BID    docusate sodium  100 mg Oral BID    [START ON 2/14/2025] ibuprofen  800 mg Oral TID    ketorolac  30 mg Intravenous Q8H    senna-docusate 8.6-50 mg  1 tablet Oral Daily     Continuous Infusions:  PRN Meds:  Current Facility-Administered Medications:     albuterol sulfate, 2.5 mg, Nebulization, Q4H PRN    ALPRAZolam, 0.25 mg, Oral, QID PRN    bisacodyL, 10 mg, Rectal, Daily PRN    diphenhydrAMINE, 25 mg, Oral, Q4H PRN    diphenhydrAMINE, 25 mg, Intravenous, Q4H PRN    HYDROmorphone, 1 mg, Intravenous, Q6H PRN    ondansetron, 8 mg, Oral, Q8H PRN    oxyCODONE-acetaminophen, 1 tablet, Oral, Q4H PRN    oxyCODONE-acetaminophen, 1 tablet, Oral, Q4H PRN    Review of patient's allergies indicates:   Allergen Reactions    Penicillins Anaphylaxis    Percocet [oxycodone-acetaminophen] Nausea Only    Amoxicillin        Objective:     Vital Signs (Most Recent):  Temp: 97.7 °F (36.5 °C) (02/13/25 0741)  Pulse: 65 (02/13/25 0741)  Resp: 18 (02/13/25 0741)  BP: (!) 94/56 (02/13/25 0741)  SpO2: 100 % (02/13/25 0741) Vital Signs (24h Range):  Temp:  [97.7 °F (36.5 °C)-98.8 °F (37.1 °C)] 97.7 °F (36.5 °C)  Pulse:  [60-85] 65  Resp:  [12-22] 18  SpO2:  [94 %-100 %] 100 %  BP: ()/(56-76) 94/56     Weight: 50.6 kg (111 lb 8.8 oz)  Body mass index is 20.4 kg/m².  Patient's last menstrual period was 02/03/2025.    I&O (Last 24H):    Intake/Output Summary (Last 24 hours) at 2/13/2025 075  Last data filed at 2/13/2025 0549  Gross per 24 hour   Intake  1045.31 ml   Output 600 ml   Net 445.31 ml         Laboratory:  Recent Lab Results       None          I have personallly reviewed all pertinent lab results from the last 24 hours.       Physical Exam:   Constitutional: She is oriented to person, place, and time. She appears well-developed. No distress.    HENT:   Head: Normocephalic and atraumatic.    Eyes: Conjunctivae are normal. Right eye exhibits no discharge. Left eye exhibits no discharge. No scleral icterus.      Pulmonary/Chest: Effort normal. No respiratory distress.        Abdominal: Soft. She exhibits abdominal incision (Pfannenstiel incision with acquacel dressing in place, CDI. Appropriately TTP.). She exhibits no distension.             Musculoskeletal: Normal range of motion. No edema.       Neurological: She is alert and oriented to person, place, and time.    Skin: Skin is warm and dry. She is not diaphoretic.             Assessment/Plan:     * S/P myomectomy  Underwent abdominal myomectomy on 2/12/25.  - POD 1  - Tolerating PO  - Pain managed on current regimen  - Ambulating  - Voiding spontaneously  - Doing well, ready for discharge          Danitza Herbert PA-C  Obstetrics & Gynecology  [facility]

## 2025-02-13 NOTE — SUBJECTIVE & OBJECTIVE
Interval History: POD 1 s/p abdominal myomectomy. Ambulating, tolerating PO, pain well managed. Needs to void one more time prior to discharge.     Scheduled Meds:   chlorhexidine  10 mL Mouth/Throat BID    docusate sodium  100 mg Oral BID    [START ON 2/14/2025] ibuprofen  800 mg Oral TID    ketorolac  30 mg Intravenous Q8H    senna-docusate 8.6-50 mg  1 tablet Oral Daily     Continuous Infusions:  PRN Meds:  Current Facility-Administered Medications:     albuterol sulfate, 2.5 mg, Nebulization, Q4H PRN    ALPRAZolam, 0.25 mg, Oral, QID PRN    bisacodyL, 10 mg, Rectal, Daily PRN    diphenhydrAMINE, 25 mg, Oral, Q4H PRN    diphenhydrAMINE, 25 mg, Intravenous, Q4H PRN    HYDROmorphone, 1 mg, Intravenous, Q6H PRN    ondansetron, 8 mg, Oral, Q8H PRN    oxyCODONE-acetaminophen, 1 tablet, Oral, Q4H PRN    oxyCODONE-acetaminophen, 1 tablet, Oral, Q4H PRN    Review of patient's allergies indicates:   Allergen Reactions    Penicillins Anaphylaxis    Percocet [oxycodone-acetaminophen] Nausea Only    Amoxicillin        Objective:     Vital Signs (Most Recent):  Temp: 97.7 °F (36.5 °C) (02/13/25 0741)  Pulse: 65 (02/13/25 0741)  Resp: 18 (02/13/25 0741)  BP: (!) 94/56 (02/13/25 0741)  SpO2: 100 % (02/13/25 0741) Vital Signs (24h Range):  Temp:  [97.7 °F (36.5 °C)-98.8 °F (37.1 °C)] 97.7 °F (36.5 °C)  Pulse:  [60-85] 65  Resp:  [12-22] 18  SpO2:  [94 %-100 %] 100 %  BP: ()/(56-76) 94/56     Weight: 50.6 kg (111 lb 8.8 oz)  Body mass index is 20.4 kg/m².  Patient's last menstrual period was 02/03/2025.    I&O (Last 24H):    Intake/Output Summary (Last 24 hours) at 2/13/2025 0759  Last data filed at 2/13/2025 0549  Gross per 24 hour   Intake 1045.31 ml   Output 600 ml   Net 445.31 ml         Laboratory:  Recent Lab Results       None          I have personallly reviewed all pertinent lab results from the last 24 hours.       Physical Exam:   Constitutional: She is oriented to person, place, and time. She appears  well-developed. No distress.    HENT:   Head: Normocephalic and atraumatic.    Eyes: Conjunctivae are normal. Right eye exhibits no discharge. Left eye exhibits no discharge. No scleral icterus.      Pulmonary/Chest: Effort normal. No respiratory distress.        Abdominal: Soft. She exhibits abdominal incision (Pfannenstiel incision with acquacel dressing in place, CDI. Appropriately TTP.). She exhibits no distension.             Musculoskeletal: Normal range of motion. No edema.       Neurological: She is alert and oriented to person, place, and time.    Skin: Skin is warm and dry. She is not diaphoretic.

## 2025-02-13 NOTE — PLAN OF CARE
308/308 AWA Davis is a 44 y.o.female admitted on 2/12/2025 for S/P myomectomy   Code Status: Full Code MRN: [id number]   Review of patient's allergies indicates:   Allergen Reactions    Penicillins Anaphylaxis    Percocet [oxycodone-acetaminophen] Nausea Only    Amoxicillin      History reviewed. No pertinent past medical history.   PRN meds    albuterol sulfate, 2.5 mg, Q4H PRN  ALPRAZolam, 0.25 mg, QID PRN  bisacodyL, 10 mg, Daily PRN  diphenhydrAMINE, 25 mg, Q4H PRN  diphenhydrAMINE, 25 mg, Q4H PRN  HYDROmorphone, 1 mg, Q6H PRN  ondansetron, 8 mg, Q8H PRN  oxyCODONE-acetaminophen, 1 tablet, Q4H PRN  oxyCODONE-acetaminophen, 1 tablet, Q4H PRN      Chart check completed. Will continue plan of care.      Orientation: oriented x 4  [place] Coma Scale Score: 15     Lead Monitored: Lead II Rhythm: normal sinus rhythm       VTE Core Measure: (SCDs) Sequential compression device initiated/maintained Last Bowel Movement: 02/11/25  Diet Adult Regular  Voiding Characteristics: ureteral catheter  Wesley Score: 20  Fall Risk Score: 2  Accucheck []   Freq?      Lines/Drains/Airways       Drain  Duration                  Urethral Catheter 02/12/25 0835 Non-latex 16 Fr. <1 day              Peripheral Intravenous Line  Duration                  Peripheral IV - Single Lumen 02/12/25 0810 20 G Right Hand <1 day

## 2025-02-17 ENCOUNTER — TELEPHONE (OUTPATIENT)
Dept: OBSTETRICS AND GYNECOLOGY | Facility: CLINIC | Age: 45
End: 2025-02-17
Payer: COMMERCIAL

## 2025-02-17 ENCOUNTER — PATIENT MESSAGE (OUTPATIENT)
Dept: OBSTETRICS AND GYNECOLOGY | Facility: CLINIC | Age: 45
End: 2025-02-17
Payer: COMMERCIAL

## 2025-02-17 NOTE — TELEPHONE ENCOUNTER
----- Message from Mayra sent at 2/17/2025  9:28 AM CST -----  Contact: Purnima  Type:  Needs Medical AdviceWho Called: KeerthitanieSymptoms (please be specific): surgery dressing concerns, she wants to make sure if she needs to check the stitches and the dressingPharmacy name and phone #:  [facility] [address] [place] [address] - [address] [address]Mwzng: [phone] Fax: [phone] Would the patient rather a call back or a response via [facility]? Call [facility] Call Back Number: [phone]!

## 2025-02-17 NOTE — TELEPHONE ENCOUNTER
Attempted to contact pt, no answer, lvm to return call to clinic.    ----- Message from [employer] sent at 2/17/2025 10:36 AM CST -----  Contact: self  ..Type:  Patient Returning CallWho Called:.Purnima Aviles Left Message for Patient:Does the patient know what this is regarding?:yes Would the patient rather a call back or a response via [facility]?  Call back [facility] Call Back Number:.[phone] (home) Additional Information: pt states she missed a call.

## 2025-02-19 ENCOUNTER — OFFICE VISIT (OUTPATIENT)
Dept: OBSTETRICS AND GYNECOLOGY | Facility: CLINIC | Age: 45
End: 2025-02-19
Payer: COMMERCIAL

## 2025-02-19 VITALS
BODY MASS INDEX: 20.82 KG/M2 | WEIGHT: 113.13 LBS | HEIGHT: 62 IN | SYSTOLIC BLOOD PRESSURE: 112 MMHG | DIASTOLIC BLOOD PRESSURE: 80 MMHG

## 2025-02-19 DIAGNOSIS — Z98.890 S/P MYOMECTOMY: Primary | ICD-10-CM

## 2025-02-19 PROCEDURE — 99999 PR PBB SHADOW E&M-EST. PATIENT-LVL III: CPT | Mod: PBBFAC,,, | Performed by: OBSTETRICS & GYNECOLOGY

## 2025-02-19 NOTE — PROGRESS NOTES
Subjective:       Patient ID: Purnima Davis is a 44 y.o. female.    Chief Complaint:  Post-op Evaluation      History of Present Illness  HPI  Postoperative Follow-up  Patient presents to the clinic 1 weeks status post abdominal myomectomy for fibroids and pelvic pain. Eating a regular diet without difficulty. Bowel movements are normal. The patient is not having any pain.  Reports occasional vaginal spotting  GYN & OB History  Patient's last menstrual period was 2025.   Date of Last Pap: 3/4/2024    OB History    Para Term  AB Living   0 0 0 0 0 0   SAB IAB Ectopic Multiple Live Births   0 0 0 0 0       Review of Systems  Review of Systems   All other systems reviewed and are negative.          Objective:      Physical Exam:   Constitutional: She is oriented to person, place, and time. She appears well-developed.     Eyes: Pupils are equal, round, and reactive to light. Conjunctivae and EOM are normal.      Pulmonary/Chest: Effort normal.        Abdominal: Soft. She exhibits abdominal incision (well healed).             Musculoskeletal: Normal range of motion.       Neurological: She is alert and oriented to person, place, and time.    Skin: Skin is warm.    Psychiatric: She has a normal mood and affect.           Assessment:        1. S/P myomectomy               Plan:      Surgical findings reviewed  Path report pending  Continue pelvic rest x 6 wks  F/u 4 wks

## 2025-02-20 LAB
FINAL PATHOLOGIC DIAGNOSIS: NORMAL
GROSS: NORMAL
Lab: NORMAL

## 2025-03-03 ENCOUNTER — OFFICE VISIT (OUTPATIENT)
Dept: OBSTETRICS AND GYNECOLOGY | Facility: CLINIC | Age: 45
End: 2025-03-03
Payer: COMMERCIAL

## 2025-03-03 VITALS
HEIGHT: 62 IN | WEIGHT: 115.88 LBS | SYSTOLIC BLOOD PRESSURE: 104 MMHG | BODY MASS INDEX: 21.32 KG/M2 | DIASTOLIC BLOOD PRESSURE: 68 MMHG

## 2025-03-03 DIAGNOSIS — Z01.419 ENCOUNTER FOR GYNECOLOGICAL EXAMINATION (GENERAL) (ROUTINE) WITHOUT ABNORMAL FINDINGS: ICD-10-CM

## 2025-03-03 DIAGNOSIS — Z12.4 SCREENING FOR CERVICAL CANCER: ICD-10-CM

## 2025-03-03 DIAGNOSIS — Z12.11 SPECIAL SCREENING FOR MALIGNANT NEOPLASMS, COLON: ICD-10-CM

## 2025-03-03 DIAGNOSIS — Z30.41 ENCOUNTER FOR SURVEILLANCE OF CONTRACEPTIVE PILLS: ICD-10-CM

## 2025-03-03 DIAGNOSIS — Z12.31 SCREENING MAMMOGRAM, ENCOUNTER FOR: Primary | ICD-10-CM

## 2025-03-03 PROCEDURE — 1159F MED LIST DOCD IN RCRD: CPT | Mod: CPTII,S$GLB,, | Performed by: OBSTETRICS & GYNECOLOGY

## 2025-03-03 PROCEDURE — 99999 PR PBB SHADOW E&M-EST. PATIENT-LVL IV: CPT | Mod: PBBFAC,,, | Performed by: OBSTETRICS & GYNECOLOGY

## 2025-03-03 PROCEDURE — 3078F DIAST BP <80 MM HG: CPT | Mod: CPTII,S$GLB,, | Performed by: OBSTETRICS & GYNECOLOGY

## 2025-03-03 PROCEDURE — 3008F BODY MASS INDEX DOCD: CPT | Mod: CPTII,S$GLB,, | Performed by: OBSTETRICS & GYNECOLOGY

## 2025-03-03 PROCEDURE — 99396 PREV VISIT EST AGE 40-64: CPT | Mod: S$GLB,,, | Performed by: OBSTETRICS & GYNECOLOGY

## 2025-03-03 PROCEDURE — 3074F SYST BP LT 130 MM HG: CPT | Mod: CPTII,S$GLB,, | Performed by: OBSTETRICS & GYNECOLOGY

## 2025-03-03 RX ORDER — NORGESTIMATE AND ETHINYL ESTRADIOL 7DAYSX3 28
1 KIT ORAL DAILY
Qty: 90 TABLET | Refills: 3 | Status: SHIPPED | OUTPATIENT
Start: 2025-03-03 | End: 2026-03-03

## 2025-03-03 RX ORDER — NORGESTIMATE AND ETHINYL ESTRADIOL 7DAYSX3 28
1 KIT ORAL DAILY
Qty: 90 TABLET | Refills: 3 | Status: SHIPPED | OUTPATIENT
Start: 2025-03-03 | End: 2025-03-03 | Stop reason: SDUPTHER

## 2025-03-03 NOTE — PROGRESS NOTES
Subjective:       Patient ID: Purnima Davis is a 45 y.o. female.    Chief Complaint:  Well Woman      History of Present Illness  HPI  Annual Exam-Premenopausal--  Patient presents for annual exam. The patient has no complaints today. The patient is sexually active. Ocp for contraception; GYN screening history: last pap: approximate date 2024 and was normal and last mammogram: approximate date 2024 and was normal. The patient wears seatbelts: yes. The patient participates in regular exercise: rare; walks for work; Has the patient ever been transfused or tattooed?: no. The patient reports that there is not domestic violence in her life.  Menses monthly on ocp; flow 4-7 days; pad or tampon-super; change q 4 hrs;   GYN & OB History  Patient's last menstrual period was 2025 (exact date).   Date of Last Pap: 3/4/2024    OB History    Para Term  AB Living   0 0 0 0 0 0   SAB IAB Ectopic Multiple Live Births   0 0 0 0 0       Review of Systems  Review of Systems   All other systems reviewed and are negative.          Objective:      Physical Exam:   Constitutional: She is oriented to person, place, and time. She appears well-developed and well-nourished.     Eyes: Pupils are equal, round, and reactive to light. Conjunctivae and EOM are normal.      Pulmonary/Chest: Effort normal. Right breast exhibits no mass, no nipple discharge, no skin change and no tenderness. Left breast exhibits no mass, no nipple discharge, no skin change and no tenderness. Breasts are symmetrical.        Abdominal: Soft.     Genitourinary:    Inguinal canal, urethra, bladder, vagina, uterus, right adnexa, left adnexa and rectum normal.      Pelvic exam was performed with patient supine.   The external female genitalia was normal.     Labial bartholins normal.Cervix is normal. Right adnexum displays no mass and no tenderness. Left adnexum displays no mass and no tenderness. No erythema, vaginal discharge (menstrual),  bleeding, rectocele, cystocele or prolapse of vaginal walls in the vagina. Vagina was moist.   pap smear completedUerus contour normal  Normal urethral meatus.Urethra findings: no urethral massBladder findings: no bladder distention and no bladder tenderness          Musculoskeletal: Normal range of motion and moves all extremeties.       Neurological: She is alert and oriented to person, place, and time.    Skin: Skin is warm.    Psychiatric: She has a normal mood and affect. Her behavior is normal.           Assessment:     Encounter Diagnoses   Name Primary?    Encounter for gynecological examination (general) (routine) without abnormal findings Yes    Screening for cervical cancer     Screening mammogram, encounter for     Encounter for surveillance of contraceptive pills     Special screening for malignant neoplasms, colon               Plan:      Continue annual well woman exam.   Pap 2024 due in 2027; Reviewed updated recommendations for pap smears (every 3 years) in low risk patients.   Recommend annual pelvic exams.  Reviewed recommendations for annual CBE.  mammo ordered, continue yearly until age 75  Continue ocp daily  Accepts colonoscopy for colon cancer screen  Continue menstrual calendar  Encouraged  diet, exercise,

## 2025-03-12 ENCOUNTER — HOSPITAL ENCOUNTER (OUTPATIENT)
Dept: PREADMISSION TESTING | Facility: HOSPITAL | Age: 45
Discharge: HOME | End: 2025-03-12
Attending: OBSTETRICS & GYNECOLOGY
Payer: COMMERCIAL

## 2025-03-12 DIAGNOSIS — Z12.11 SPECIAL SCREENING FOR MALIGNANT NEOPLASMS, COLON: Primary | ICD-10-CM

## 2025-03-12 RX ORDER — SODIUM, POTASSIUM,MAG SULFATES 17.5-3.13G
1 SOLUTION, RECONSTITUTED, ORAL ORAL DAILY
Qty: 1 KIT | Refills: 0 | Status: SHIPPED | OUTPATIENT
Start: 2025-03-12 | End: 2025-03-14

## 2025-03-25 ENCOUNTER — TELEPHONE (OUTPATIENT)
Dept: OBSTETRICS AND GYNECOLOGY | Facility: CLINIC | Age: 45
End: 2025-03-25
Payer: COMMERCIAL

## 2025-03-25 NOTE — TELEPHONE ENCOUNTER
----- Message from Mariluz sent at 3/25/2025 11:52 AM CDT -----  Contact: [phone]  Type:  Needs Medical AdviceWho Called: Purnima Symptoms (please be specific): Fibroids How long has patient had these symptoms:  n/a Pharmacy name and phone #:  [facility], [place] - [address] [id number] [facility] [address]Wzfht: [phone] Fax: [phone] Would the patient rather a call back or a response via [facility]? Call [facility] Call Back Number: [phone] Additional Information: pt is calling in regards to seeing if she needs to schedule an post op visit for a recent surgery she had. Pt is also needing paperwork filled out to return back to work. Thanks KB

## 2025-03-25 NOTE — TELEPHONE ENCOUNTER
Called patient and per LL notes, it seems she is ok to go back to work at the end of her leave (3/26/25).  Patient will stop by clinic tomorrow to get RTW paperwork.

## 2025-03-26 ENCOUNTER — TELEPHONE (OUTPATIENT)
Dept: OBSTETRICS AND GYNECOLOGY | Facility: CLINIC | Age: 45
End: 2025-03-26
Payer: COMMERCIAL

## 2025-04-14 ENCOUNTER — PATIENT MESSAGE (OUTPATIENT)
Dept: SURGERY | Facility: HOSPITAL | Age: 45
End: 2025-04-14
Payer: COMMERCIAL

## 2025-04-22 ENCOUNTER — PATIENT MESSAGE (OUTPATIENT)
Dept: SURGERY | Facility: HOSPITAL | Age: 45
End: 2025-04-22
Payer: COMMERCIAL

## 2025-04-23 ENCOUNTER — TELEPHONE (OUTPATIENT)
Dept: PREADMISSION TESTING | Facility: HOSPITAL | Age: 45
End: 2025-04-23
Payer: COMMERCIAL

## 2025-04-23 NOTE — TELEPHONE ENCOUNTER
----- Message from Zandra sent at 4/23/2025  9:44 AM CDT -----  Type:  Needs Medical AdviceWho Called: MATT FERNÁNDEZ [[id number]]Symptoms (please be specific):  How long has patient had these symptoms:  Pharmacy name and phone #:  Would the patient rather a call back or a response via [facility]? [facility] Call Back Number:  [phone] Information:  Patient will like to cancel appt for 4-30 and reschedule. Please call to reschedule

## (undated) DEVICE — NDL SPINAL SPINOCAN 22GX3.5

## (undated) DEVICE — MANIFOLD 4 PORT

## (undated) DEVICE — APPLICATOR CHLORAPREP ORN 26ML

## (undated) DEVICE — SUT MONOCRYL 3-0 SH U/D

## (undated) DEVICE — SUT V-LOC 2-0 180

## (undated) DEVICE — PACK BASIC SETUP SC BR

## (undated) DEVICE — TRAY CATH FOL SIL DRN BAG 16FR

## (undated) DEVICE — GLOVE SIGNATURE ESSNTL LTX 7

## (undated) DEVICE — TUBING MEDI-VAC 20FT .25IN

## (undated) DEVICE — TRAY SKIN SCRUB WET 4 COMPART

## (undated) DEVICE — TOWEL OR DISP STRL BLUE 4/PK

## (undated) DEVICE — ELECTRODE REM PLYHSV RETURN 9

## (undated) DEVICE — COVER LIGHT HANDLE 80/CA

## (undated) DEVICE — GOWN POLY REINF BRTH SLV XL

## (undated) DEVICE — DRESSING AQUACEL AG 3.5X10IN

## (undated) DEVICE — PAD ABDOMINAL STERILE 8X10IN

## (undated) DEVICE — NDL BLUNT W/O FILTER 18GX1.5IN

## (undated) DEVICE — DRAPE T TRNSVRS LAP 102X78X121

## (undated) DEVICE — SUT GUT PL 3-0 CT 852H

## (undated) DEVICE — TRAY CATH 1-LYR URIMTR 16FR

## (undated) DEVICE — SOL IRR STRL WATER 500ML

## (undated) DEVICE — GLOVE SENSICARE PI GRN 7

## (undated) DEVICE — HEADREST ROUND DISP FOAM 9IN

## (undated) DEVICE — SUT V-LOC 180 ABD 2/0 GS-21

## (undated) DEVICE — YANKAUER FLEX NO VENT REG CAP

## (undated) DEVICE — SPONGE LAP 18X18 PREWASHED

## (undated) DEVICE — SUT CTD VICRYL PLUS 4/0

## (undated) DEVICE — SUT VICRYL 2-0 27 CT-1

## (undated) DEVICE — SUT MONOCRYL 4-0 PS-1 UND

## (undated) DEVICE — SYR B-D DISP CONTROL 10CC100/C

## (undated) DEVICE — DRAPE CORETEMP FLD WRM 56X62IN